# Patient Record
Sex: MALE | Race: BLACK OR AFRICAN AMERICAN | NOT HISPANIC OR LATINO | Employment: FULL TIME | URBAN - METROPOLITAN AREA
[De-identification: names, ages, dates, MRNs, and addresses within clinical notes are randomized per-mention and may not be internally consistent; named-entity substitution may affect disease eponyms.]

---

## 2021-10-09 ENCOUNTER — HOSPITAL ENCOUNTER (OUTPATIENT)
Facility: OTHER | Age: 61
Discharge: HOME | DRG: 419 | End: 2021-10-13
Attending: HOSPITALIST | Admitting: HOSPITALIST
Payer: COMMERCIAL

## 2021-10-09 DIAGNOSIS — K81.9 CHOLECYSTITIS: ICD-10-CM

## 2021-10-09 LAB
ALBUMIN SERPL BCP-MCNC: 3.4 G/DL (ref 3.5–5.2)
ALP SERPL-CCNC: 50 U/L (ref 55–135)
ALT SERPL W/O P-5'-P-CCNC: 16 U/L (ref 10–44)
ANION GAP SERPL CALC-SCNC: 10 MMOL/L (ref 8–16)
APTT BLDCRRT: 26.5 SEC (ref 21–32)
AST SERPL-CCNC: 19 U/L (ref 10–40)
BASOPHILS # BLD AUTO: 0.02 K/UL (ref 0–0.2)
BASOPHILS NFR BLD: 0.1 % (ref 0–1.9)
BILIRUB SERPL-MCNC: 1.6 MG/DL (ref 0.1–1)
BUN SERPL-MCNC: 6 MG/DL (ref 8–23)
CALCIUM SERPL-MCNC: 9 MG/DL (ref 8.7–10.5)
CHLORIDE SERPL-SCNC: 106 MMOL/L (ref 95–110)
CO2 SERPL-SCNC: 21 MMOL/L (ref 23–29)
CREAT SERPL-MCNC: 0.8 MG/DL (ref 0.5–1.4)
DIFFERENTIAL METHOD: ABNORMAL
EOSINOPHIL # BLD AUTO: 0 K/UL (ref 0–0.5)
EOSINOPHIL NFR BLD: 0 % (ref 0–8)
ERYTHROCYTE [DISTWIDTH] IN BLOOD BY AUTOMATED COUNT: 12.9 % (ref 11.5–14.5)
EST. GFR  (AFRICAN AMERICAN): >60 ML/MIN/1.73 M^2
EST. GFR  (NON AFRICAN AMERICAN): >60 ML/MIN/1.73 M^2
GLUCOSE SERPL-MCNC: 118 MG/DL (ref 70–110)
HCT VFR BLD AUTO: 43.8 % (ref 40–54)
HGB BLD-MCNC: 14.3 G/DL (ref 14–18)
IMM GRANULOCYTES # BLD AUTO: 0.09 K/UL (ref 0–0.04)
IMM GRANULOCYTES NFR BLD AUTO: 0.5 % (ref 0–0.5)
INR PPP: 1 (ref 0.8–1.2)
LACTATE SERPL-SCNC: 1.2 MMOL/L (ref 0.5–2.2)
LYMPHOCYTES # BLD AUTO: 1.1 K/UL (ref 1–4.8)
LYMPHOCYTES NFR BLD: 6.4 % (ref 18–48)
MAGNESIUM SERPL-MCNC: 2 MG/DL (ref 1.6–2.6)
MCH RBC QN AUTO: 29.9 PG (ref 27–31)
MCHC RBC AUTO-ENTMCNC: 32.6 G/DL (ref 32–36)
MCV RBC AUTO: 91 FL (ref 82–98)
MONOCYTES # BLD AUTO: 1.8 K/UL (ref 0.3–1)
MONOCYTES NFR BLD: 10.6 % (ref 4–15)
NEUTROPHILS # BLD AUTO: 13.9 K/UL (ref 1.8–7.7)
NEUTROPHILS NFR BLD: 82.4 % (ref 38–73)
NRBC BLD-RTO: 0 /100 WBC
PHOSPHATE SERPL-MCNC: 2.4 MG/DL (ref 2.7–4.5)
PLATELET # BLD AUTO: 189 K/UL (ref 150–450)
PMV BLD AUTO: 9.7 FL (ref 9.2–12.9)
POTASSIUM SERPL-SCNC: 4 MMOL/L (ref 3.5–5.1)
PROT SERPL-MCNC: 7 G/DL (ref 6–8.4)
PROTHROMBIN TIME: 11.2 SEC (ref 9–12.5)
RBC # BLD AUTO: 4.79 M/UL (ref 4.6–6.2)
SODIUM SERPL-SCNC: 137 MMOL/L (ref 136–145)
WBC # BLD AUTO: 16.87 K/UL (ref 3.9–12.7)

## 2021-10-09 PROCEDURE — 85610 PROTHROMBIN TIME: CPT | Performed by: NURSE PRACTITIONER

## 2021-10-09 PROCEDURE — G0378 HOSPITAL OBSERVATION PER HR: HCPCS

## 2021-10-09 PROCEDURE — 63600175 PHARM REV CODE 636 W HCPCS: Performed by: NURSE PRACTITIONER

## 2021-10-09 PROCEDURE — 96365 THER/PROPH/DIAG IV INF INIT: CPT | Performed by: HOSPITALIST

## 2021-10-09 PROCEDURE — 85025 COMPLETE CBC W/AUTO DIFF WBC: CPT | Performed by: NURSE PRACTITIONER

## 2021-10-09 PROCEDURE — 99223 1ST HOSP IP/OBS HIGH 75: CPT | Mod: ,,, | Performed by: NURSE PRACTITIONER

## 2021-10-09 PROCEDURE — G0379 DIRECT REFER HOSPITAL OBSERV: HCPCS

## 2021-10-09 PROCEDURE — 99223 PR INITIAL HOSPITAL CARE,LEVL III: ICD-10-PCS | Mod: ,,, | Performed by: NURSE PRACTITIONER

## 2021-10-09 PROCEDURE — 11000001 HC ACUTE MED/SURG PRIVATE ROOM

## 2021-10-09 PROCEDURE — 36415 COLL VENOUS BLD VENIPUNCTURE: CPT | Performed by: NURSE PRACTITIONER

## 2021-10-09 PROCEDURE — 83605 ASSAY OF LACTIC ACID: CPT | Performed by: NURSE PRACTITIONER

## 2021-10-09 PROCEDURE — 96366 THER/PROPH/DIAG IV INF ADDON: CPT | Performed by: HOSPITALIST

## 2021-10-09 PROCEDURE — 85730 THROMBOPLASTIN TIME PARTIAL: CPT | Performed by: NURSE PRACTITIONER

## 2021-10-09 PROCEDURE — 25000003 PHARM REV CODE 250: Performed by: NURSE PRACTITIONER

## 2021-10-09 PROCEDURE — 84100 ASSAY OF PHOSPHORUS: CPT | Performed by: NURSE PRACTITIONER

## 2021-10-09 PROCEDURE — 80053 COMPREHEN METABOLIC PANEL: CPT | Performed by: NURSE PRACTITIONER

## 2021-10-09 PROCEDURE — 83735 ASSAY OF MAGNESIUM: CPT | Performed by: NURSE PRACTITIONER

## 2021-10-09 PROCEDURE — 96375 TX/PRO/DX INJ NEW DRUG ADDON: CPT | Performed by: HOSPITALIST

## 2021-10-09 RX ORDER — ACETAMINOPHEN 325 MG/1
650 TABLET ORAL EVERY 4 HOURS PRN
Status: DISCONTINUED | OUTPATIENT
Start: 2021-10-09 | End: 2021-10-13 | Stop reason: HOSPADM

## 2021-10-09 RX ORDER — HYDROMORPHONE HYDROCHLORIDE 1 MG/ML
1 INJECTION, SOLUTION INTRAMUSCULAR; INTRAVENOUS; SUBCUTANEOUS EVERY 4 HOURS PRN
Status: DISCONTINUED | OUTPATIENT
Start: 2021-10-09 | End: 2021-10-13 | Stop reason: HOSPADM

## 2021-10-09 RX ORDER — ONDANSETRON 8 MG/1
8 TABLET, ORALLY DISINTEGRATING ORAL EVERY 8 HOURS PRN
Status: DISCONTINUED | OUTPATIENT
Start: 2021-10-09 | End: 2021-10-13 | Stop reason: HOSPADM

## 2021-10-09 RX ORDER — SODIUM CHLORIDE 9 MG/ML
INJECTION, SOLUTION INTRAVENOUS CONTINUOUS
Status: DISCONTINUED | OUTPATIENT
Start: 2021-10-09 | End: 2021-10-13 | Stop reason: HOSPADM

## 2021-10-09 RX ORDER — SODIUM CHLORIDE 0.9 % (FLUSH) 0.9 %
10 SYRINGE (ML) INJECTION EVERY 8 HOURS PRN
Status: DISCONTINUED | OUTPATIENT
Start: 2021-10-09 | End: 2021-10-13 | Stop reason: HOSPADM

## 2021-10-09 RX ADMIN — PIPERACILLIN SODIUM AND TAZOBACTAM SODIUM 4.5 G: 4; .5 INJECTION, POWDER, FOR SOLUTION INTRAVENOUS at 09:10

## 2021-10-09 RX ADMIN — HYDROMORPHONE HYDROCHLORIDE 1 MG: 1 INJECTION, SOLUTION INTRAMUSCULAR; INTRAVENOUS; SUBCUTANEOUS at 09:10

## 2021-10-09 RX ADMIN — SODIUM CHLORIDE: 0.9 INJECTION, SOLUTION INTRAVENOUS at 09:10

## 2021-10-09 NOTE — PLAN OF CARE
(Physician in Lead of Transfers)   Outside Transfer Acceptance Note / Regional Referral [facility]    Transferring Physician: Mark Chaney MD (ED)    Accepting Physician: Sindy Lepe MD    Date of Acceptance: 10/09/2021    Transferring Facility: [facility] ED    Destination Facility and Admitting Physician: [facility], Dr Carole Sorenson    Reason for Transfer: pt insurance is [facility] only    Report from Transferring Physician/Hospital course: 60M w no known PMH who presented to the  ED with postprandial abd pain x 3 days, diffuse B UQ (R >> L), with N/V x 1 and the emesis contained small blood clots, and he also has diaphoresis.   In ED, VSS.  Pain 9/10. WBC 15K, H/H wnl at 15.4/45.5. Cr 0.8. TB 1.1. AP 64. ASt and ALT 17 and 64. Lipase 22. . Trop neg. RUQ U/S: cholecytitis, +cholelithiasis with thickened GB wall (4.0 mm) and trace pericholecystic fluid, distended with 1.8cm stone at gallbladder neck, CBD wnl 3.4mm. Incidental probable hepatic hemangiomas (nonshadowing echogenic liver lesions). CT A/p with IV cont: GB distended with wall thickening, perichole fluid, 2.3cm stone near GB neck, c/w cholecystitis - see full report scanned in media tab for incidental findings. Rec'd Zosyn.     Pt's insurance is [facility], [facility]  Dr Ronny Pineda, gen surg [facility] to consult and requested HM to be primary    Fully vaccinated against COVID (Pfizer) per record review.    VS: 98.5, 174/86, HR 62, 99% RA    Labs: see above. COVID negative    Radiographs: see above    To Do List: Admit to HM, consult gen surg. See outside records scanned in to media tab in Epic.    Upon patient arrival to floor, please contact Hospital Medicine on call.     Sindy Lepe MD  Hospital Medicine Staff

## 2021-10-10 LAB
ALBUMIN SERPL BCP-MCNC: 3.1 G/DL (ref 3.5–5.2)
ALP SERPL-CCNC: 79 U/L (ref 55–135)
ALT SERPL W/O P-5'-P-CCNC: 74 U/L (ref 10–44)
ANION GAP SERPL CALC-SCNC: 11 MMOL/L (ref 8–16)
AST SERPL-CCNC: 91 U/L (ref 10–40)
BASOPHILS # BLD AUTO: 0.03 K/UL (ref 0–0.2)
BASOPHILS NFR BLD: 0.2 % (ref 0–1.9)
BILIRUB SERPL-MCNC: 2.1 MG/DL (ref 0.1–1)
BUN SERPL-MCNC: 7 MG/DL (ref 8–23)
CALCIUM SERPL-MCNC: 9 MG/DL (ref 8.7–10.5)
CHLORIDE SERPL-SCNC: 106 MMOL/L (ref 95–110)
CO2 SERPL-SCNC: 21 MMOL/L (ref 23–29)
CREAT SERPL-MCNC: 0.8 MG/DL (ref 0.5–1.4)
DIFFERENTIAL METHOD: ABNORMAL
EOSINOPHIL # BLD AUTO: 0 K/UL (ref 0–0.5)
EOSINOPHIL NFR BLD: 0.1 % (ref 0–8)
ERYTHROCYTE [DISTWIDTH] IN BLOOD BY AUTOMATED COUNT: 13 % (ref 11.5–14.5)
EST. GFR  (AFRICAN AMERICAN): >60 ML/MIN/1.73 M^2
EST. GFR  (NON AFRICAN AMERICAN): >60 ML/MIN/1.73 M^2
GLUCOSE SERPL-MCNC: 113 MG/DL (ref 70–110)
HCT VFR BLD AUTO: 41.9 % (ref 40–54)
HGB BLD-MCNC: 13.5 G/DL (ref 14–18)
IMM GRANULOCYTES # BLD AUTO: 0.07 K/UL (ref 0–0.04)
IMM GRANULOCYTES NFR BLD AUTO: 0.5 % (ref 0–0.5)
LYMPHOCYTES # BLD AUTO: 1 K/UL (ref 1–4.8)
LYMPHOCYTES NFR BLD: 6.5 % (ref 18–48)
MAGNESIUM SERPL-MCNC: 2 MG/DL (ref 1.6–2.6)
MCH RBC QN AUTO: 29.5 PG (ref 27–31)
MCHC RBC AUTO-ENTMCNC: 32.2 G/DL (ref 32–36)
MCV RBC AUTO: 92 FL (ref 82–98)
MONOCYTES # BLD AUTO: 1.6 K/UL (ref 0.3–1)
MONOCYTES NFR BLD: 10.6 % (ref 4–15)
NEUTROPHILS # BLD AUTO: 12.6 K/UL (ref 1.8–7.7)
NEUTROPHILS NFR BLD: 82.1 % (ref 38–73)
NRBC BLD-RTO: 0 /100 WBC
PHOSPHATE SERPL-MCNC: 2.1 MG/DL (ref 2.7–4.5)
PLATELET # BLD AUTO: 191 K/UL (ref 150–450)
PMV BLD AUTO: 10 FL (ref 9.2–12.9)
POTASSIUM SERPL-SCNC: 3.9 MMOL/L (ref 3.5–5.1)
PROT SERPL-MCNC: 6.7 G/DL (ref 6–8.4)
RBC # BLD AUTO: 4.57 M/UL (ref 4.6–6.2)
SODIUM SERPL-SCNC: 138 MMOL/L (ref 136–145)
WBC # BLD AUTO: 15.3 K/UL (ref 3.9–12.7)

## 2021-10-10 PROCEDURE — 84100 ASSAY OF PHOSPHORUS: CPT | Performed by: NURSE PRACTITIONER

## 2021-10-10 PROCEDURE — 80053 COMPREHEN METABOLIC PANEL: CPT | Performed by: NURSE PRACTITIONER

## 2021-10-10 PROCEDURE — 96372 THER/PROPH/DIAG INJ SC/IM: CPT | Mod: 59 | Performed by: HOSPITALIST

## 2021-10-10 PROCEDURE — 63600175 PHARM REV CODE 636 W HCPCS: Performed by: NURSE PRACTITIONER

## 2021-10-10 PROCEDURE — 99232 PR SUBSEQUENT HOSPITAL CARE,LEVL II: ICD-10-PCS | Mod: ,,, | Performed by: PHYSICIAN ASSISTANT

## 2021-10-10 PROCEDURE — G0378 HOSPITAL OBSERVATION PER HR: HCPCS

## 2021-10-10 PROCEDURE — 85025 COMPLETE CBC W/AUTO DIFF WBC: CPT | Performed by: NURSE PRACTITIONER

## 2021-10-10 PROCEDURE — 11000001 HC ACUTE MED/SURG PRIVATE ROOM

## 2021-10-10 PROCEDURE — 99232 PR SUBSEQUENT HOSPITAL CARE,LEVL II: ICD-10-PCS | Mod: 57,,, | Performed by: SPECIALIST

## 2021-10-10 PROCEDURE — 96376 TX/PRO/DX INJ SAME DRUG ADON: CPT | Performed by: HOSPITALIST

## 2021-10-10 PROCEDURE — 83735 ASSAY OF MAGNESIUM: CPT | Performed by: NURSE PRACTITIONER

## 2021-10-10 PROCEDURE — 36415 COLL VENOUS BLD VENIPUNCTURE: CPT | Performed by: NURSE PRACTITIONER

## 2021-10-10 PROCEDURE — 99232 SBSQ HOSP IP/OBS MODERATE 35: CPT | Mod: ,,, | Performed by: PHYSICIAN ASSISTANT

## 2021-10-10 PROCEDURE — 25000003 PHARM REV CODE 250: Performed by: NURSE PRACTITIONER

## 2021-10-10 PROCEDURE — 63600175 PHARM REV CODE 636 W HCPCS: Performed by: PHYSICIAN ASSISTANT

## 2021-10-10 PROCEDURE — 25000003 PHARM REV CODE 250: Performed by: PHYSICIAN ASSISTANT

## 2021-10-10 PROCEDURE — 96366 THER/PROPH/DIAG IV INF ADDON: CPT | Performed by: HOSPITALIST

## 2021-10-10 PROCEDURE — 99232 SBSQ HOSP IP/OBS MODERATE 35: CPT | Mod: 57,,, | Performed by: SPECIALIST

## 2021-10-10 PROCEDURE — 96375 TX/PRO/DX INJ NEW DRUG ADDON: CPT | Performed by: HOSPITALIST

## 2021-10-10 PROCEDURE — 96361 HYDRATE IV INFUSION ADD-ON: CPT | Performed by: HOSPITALIST

## 2021-10-10 RX ORDER — ENOXAPARIN SODIUM 100 MG/ML
40 INJECTION SUBCUTANEOUS EVERY 24 HOURS
Status: DISCONTINUED | OUTPATIENT
Start: 2021-10-10 | End: 2021-10-13 | Stop reason: HOSPADM

## 2021-10-10 RX ADMIN — ACETAMINOPHEN 650 MG: 325 TABLET ORAL at 03:10

## 2021-10-10 RX ADMIN — HYDROMORPHONE HYDROCHLORIDE 1 MG: 1 INJECTION, SOLUTION INTRAMUSCULAR; INTRAVENOUS; SUBCUTANEOUS at 03:10

## 2021-10-10 RX ADMIN — PIPERACILLIN SODIUM AND TAZOBACTAM SODIUM 4.5 G: 4; .5 INJECTION, POWDER, FOR SOLUTION INTRAVENOUS at 01:10

## 2021-10-10 RX ADMIN — SODIUM CHLORIDE: 0.9 INJECTION, SOLUTION INTRAVENOUS at 05:10

## 2021-10-10 RX ADMIN — PIPERACILLIN SODIUM AND TAZOBACTAM SODIUM 4.5 G: 4; .5 INJECTION, POWDER, FOR SOLUTION INTRAVENOUS at 10:10

## 2021-10-10 RX ADMIN — PIPERACILLIN SODIUM AND TAZOBACTAM SODIUM 4.5 G: 4; .5 INJECTION, POWDER, FOR SOLUTION INTRAVENOUS at 05:10

## 2021-10-10 RX ADMIN — ENOXAPARIN SODIUM 40 MG: 100 INJECTION SUBCUTANEOUS at 05:10

## 2021-10-10 RX ADMIN — POTASSIUM PHOSPHATE, MONOBASIC AND POTASSIUM PHOSPHATE, DIBASIC 15 MMOL: 224; 236 INJECTION, SOLUTION, CONCENTRATE INTRAVENOUS at 08:10

## 2021-10-10 NOTE — SUBJECTIVE & OBJECTIVE
No past medical history on file.    No past surgical history on file.    Review of patient's allergies indicates:  No Known Allergies    No current facility-administered medications on file prior to encounter.     No current outpatient medications on file prior to encounter.     Family History     Family history is unknown by patient.        Tobacco Use    Smoking status: Not on file   Substance and Sexual Activity    Alcohol use: Not on file    Drug use: Not on file    Sexual activity: Not on file     Review of Systems   Constitutional: Positive for activity change and appetite change. Negative for fatigue and fever.   HENT: Negative for congestion, ear pain and postnasal drip.    Eyes: Negative for discharge.   Respiratory: Negative for apnea, shortness of breath and wheezing.    Cardiovascular: Negative for chest pain and leg swelling.   Gastrointestinal: Positive for abdominal pain, nausea and vomiting. Negative for abdominal distention.   Endocrine: Negative for polydipsia, polyphagia and polyuria.   Genitourinary: Negative for difficulty urinating, flank pain, frequency, hematuria and urgency.   Musculoskeletal: Negative for arthralgias and joint swelling.   Skin: Negative for pallor and rash.   Allergic/Immunologic: Negative for environmental allergies and food allergies.   Neurological: Negative for dizziness, speech difficulty, weakness, light-headedness and headaches.   Hematological: Does not bruise/bleed easily.   Psychiatric/Behavioral: Negative for agitation.     Objective:     Vital Signs (Most Recent):  Temp: 99.7 °F (37.6 °C) (10/09/21 2010)  Pulse: (!) 113 (10/09/21 2010)  Resp: 20 (10/09/21 2010)  BP: (!) 170/84 (10/09/21 2010)  SpO2: 95 % (10/09/21 2010) Vital Signs (24h Range):  Temp:  [98.8 °F (37.1 °C)-99.7 °F (37.6 °C)] 99.7 °F (37.6 °C)  Pulse:  [] 113  Resp:  [20-22] 20  SpO2:  [95 %-96 %] 95 %  BP: (148-170)/(70-84) 170/84     Weight: 111.3 kg (245 lb 6 oz)  Body mass index is  34.22 kg/m².    Physical Exam  Constitutional:       Appearance: Normal appearance. He is well-developed.   HENT:      Head: Normocephalic.   Eyes:      Conjunctiva/sclera: Conjunctivae normal.   Cardiovascular:      Rate and Rhythm: Regular rhythm. Tachycardia present.      Pulses:           Radial pulses are 2+ on the right side and 2+ on the left side.      Heart sounds: Normal heart sounds.   Pulmonary:      Effort: Pulmonary effort is normal.      Breath sounds: Normal breath sounds.   Abdominal:      General: Bowel sounds are increased. There is no distension.      Palpations: Abdomen is soft.      Tenderness: There is abdominal tenderness in the right upper quadrant.   Musculoskeletal:         General: Normal range of motion.      Cervical back: Normal range of motion and neck supple.   Skin:     General: Skin is warm and dry.      Coloration: Skin is pale.   Neurological:      Mental Status: He is alert and oriented to person, place, and time.      Motor: Motor function is intact.   Psychiatric:         Mood and Affect: Mood normal.         Speech: Speech normal.         Behavior: Behavior normal.             Significant Labs: All pertinent labs within the past 24 hours have been reviewed.    Significant Imaging: I have reviewed all pertinent imaging results/findings within the past 24 hours.

## 2021-10-10 NOTE — SUBJECTIVE & OBJECTIVE
Interval History: No events overnight.  Doing fine this morning, pain well controlled.  No N/V overnight.  Says he is ready to have surgery and go home.    Review of Systems   Constitutional: Negative for fatigue and fever.   Respiratory: Negative for cough, shortness of breath and wheezing.    Cardiovascular: Negative for chest pain, palpitations and leg swelling.   Gastrointestinal: Positive for abdominal pain (mild, improved). Negative for constipation, diarrhea, nausea and vomiting.   Genitourinary: Negative for difficulty urinating.   Musculoskeletal: Negative for gait problem.   Psychiatric/Behavioral: Negative for agitation and confusion.     Objective:     Vital Signs (Most Recent):  Temp: 99.4 °F (37.4 °C) (10/10/21 0712)  Pulse: 84 (10/10/21 0712)  Resp: 20 (10/10/21 0712)  BP: 138/82 (10/10/21 0712)  SpO2: 95 % (10/10/21 0712) Vital Signs (24h Range):  Temp:  [98.2 °F (36.8 °C)-99.7 °F (37.6 °C)] 99.4 °F (37.4 °C)  Pulse:  [] 84  Resp:  [16-22] 20  SpO2:  [94 %-96 %] 95 %  BP: (119-170)/(59-84) 138/82     Weight: 111.3 kg (245 lb 6 oz)  Body mass index is 34.22 kg/m².    Intake/Output Summary (Last 24 hours) at 10/10/2021 1040  Last data filed at 10/10/2021 0509  Gross per 24 hour   Intake 968.06 ml   Output 375 ml   Net 593.06 ml      Physical Exam  Vitals and nursing note reviewed.   Constitutional:       General: He is not in acute distress.     Appearance: Normal appearance. He is not ill-appearing.   Cardiovascular:      Rate and Rhythm: Normal rate and regular rhythm.      Pulses: Normal pulses.      Heart sounds: Normal heart sounds. No murmur heard.     Pulmonary:      Effort: Pulmonary effort is normal.      Breath sounds: Normal breath sounds. No wheezing or rales.   Abdominal:      General: Bowel sounds are normal. There is no distension.      Palpations: Abdomen is soft.      Tenderness: There is no abdominal tenderness (mild TTP in RUQ).   Musculoskeletal:      Right lower leg: No  edema.      Left lower leg: No edema.   Skin:     General: Skin is warm and dry.   Neurological:      General: No focal deficit present.      Mental Status: He is alert and oriented to person, place, and time. Mental status is at baseline.   Psychiatric:         Behavior: Behavior normal.         Thought Content: Thought content normal.         Significant Labs:   All pertinent labs within the past 24 hours have been reviewed.  CBC:   Recent Labs   Lab 10/09/21  2128 10/10/21  0326   WBC 16.87* 15.30*   HGB 14.3 13.5*   HCT 43.8 41.9    191     CMP:   Recent Labs   Lab 10/09/21  2128 10/10/21  0326    138   K 4.0 3.9    106   CO2 21* 21*   * 113*   BUN 6* 7*   CREATININE 0.8 0.8   CALCIUM 9.0 9.0   PROT 7.0 6.7   ALBUMIN 3.4* 3.1*   BILITOT 1.6* 2.1*   ALKPHOS 50* 79   AST 19 91*   ALT 16 74*   ANIONGAP 10 11   EGFRNONAA >60 >60       Significant Imaging: I have reviewed all pertinent imaging results/findings within the past 24 hours.

## 2021-10-10 NOTE — CONSULTS
[facility]  Consult Note      Date of Consultation:10/10/2021    Reason for Consult:  Abdominal pain  SUBJECTIVE:     History of Present Illness:  60-year-old male transferred from [facility] with history of upper abdominal pain since Wednesday.  This was associated with nausea and vomiting.  Patient denies jaundice, weight loss, change in bowel habits.  Ultrasound at [facility] showed cholelithiasis with gallbladder wall thickening and pericholecystic edema    Past Medical History:   Diagnosis Date    No pertinent past medical history      Past Surgical History:   Procedure Laterality Date    NO PAST SURGERIES       Family History   Family history unknown: Yes     Social History     Tobacco Use    Smoking status: Never Smoker    Smokeless tobacco: Never Used   Substance Use Topics    Alcohol use: Not Currently     Comment: stopped drinking beer in 2003    Drug use: Never       Review of patient's allergies indicates:  No Known Allergies    Review of Systems:  Constitutional: No fever or chills, no jaundice  Respiratory: No cough or shortness of breath  Cardiovascular: No chest pain or palpitations  Gastrointestinal:  Positive for nausea and vomiting, no diarrhea, constipation, weight loss, melena, hematochezia  Neurological: No confusion or weakness    Current Facility-Administered Medications   Medication    0.9%  NaCl infusion    acetaminophen tablet 650 mg    enoxaparin injection 40 mg    HYDROmorphone injection 1 mg    ondansetron disintegrating tablet 8 mg    piperacillin-tazobactam 4.5 g in dextrose 5 % 100 mL IVPB (ready to mix system)    sodium chloride 0.9% flush 10 mL       OBJECTIVE:     Physical Exam:  General appearance: Well developed, well nourished  Eyes:  Conjunctivae/corneas clear. PERRL.  Lungs: Normal respiratory effort,   clear to auscultation bilaterally   Heart: Regular rate and rhythm, S1, S2 normal, no murmur, rub or tanisha.  Abdomen: Soft,  non-tender non-distended; bowel sounds normal; no masses,  no organomegaly  Extremities: No cyanosis or clubbing. No edema.    Skin: Skin color, texture, turgor normal. No rashes or lesions  Neurologic: Normal strength and tone. No focal numbness or weakness       Laboratory:  Recent Labs   Lab 10/10/21  0326   WBC 15.30*   RBC 4.57*   HGB 13.5*   HCT 41.9      MCV 92   MCH 29.5   MCHC 32.2     BMP:   Recent Labs   Lab 10/10/21  0326   *      K 3.9      CO2 21*   BUN 7*   CREATININE 0.8   CALCIUM 9.0   MG 2.0     Lab Results   Component Value Date    CALCIUM 9.0 10/10/2021    PHOS 2.1 (L) 10/10/2021     BNP  No results for input(s): BNP, BNPTRIAGEBLO in the last 168 hours.No results found for: URICACIDNo results found for: IRON, TIBC, FERRITIN, SATURATEDIRO  Lab Results   Component Value Date    CALCIUM 9.0 10/10/2021    PHOS 2.1 (L) 10/10/2021       Diagnostic Results:  No orders to display       Impression:  Acute cholecystitis    Plan:     Thank you for the opportunity of seeing Yusuf Marie in consultation

## 2021-10-10 NOTE — H&P
[facility] Hospital Medicine  History & Physical    Patient Name: Yusuf Marie  MRN: [id number]  Patient Class: IP- Inpatient  Admission Date: 10/9/2021  Attending Physician: Carole Sorenson MD   Primary Care Provider: Primary Doctor No         Patient information was obtained from patient, past medical records and ER records.     Subjective:     Principal Problem:Cholecystitis    Chief Complaint: No chief complaint on file.       HPI: The patient is a 60 year old male with no known past medical history who presented to the  ED with postprandial abd pain x 3 days, diffuse bilateral upper abdominal pain with nausea and vomiting x 1 and the emesis contained small blood clots.  In ED, VSS.  Pain 9/10. RUQ U/S: cholecytitis, +cholelithiasis with thickened GB wall (4.0 mm) and trace pericholecystic fluid, distended with 1.8cm stone at gallbladder neck, CBD wnl 3.4mm. Incidental probable hepatic hemangiomas (nonshadowing echogenic liver lesions). CT A/p with IV cont: GB distended with wall thickening, perichole fluid, 2.3cm stone near GB neck, c/w cholecystitis.  The patient was transferred to [facility] due to insurance concerns for cholecystitis and General Surgery consult.      No past medical history on file.    No past surgical history on file.    Review of patient's allergies indicates:  No Known Allergies    No current facility-administered medications on file prior to encounter.     No current outpatient medications on file prior to encounter.     Family History     Family history is unknown by patient.        Tobacco Use    Smoking status: Not on file   Substance and Sexual Activity    Alcohol use: Not on file    Drug use: Not on file    Sexual activity: Not on file     Review of Systems   Constitutional: Positive for activity change and appetite change. Negative for fatigue and fever.   HENT: Negative for congestion, ear pain and postnasal drip.    Eyes: Negative for discharge.    Respiratory: Negative for apnea, shortness of breath and wheezing.    Cardiovascular: Negative for chest pain and leg swelling.   Gastrointestinal: Positive for abdominal pain, nausea and vomiting. Negative for abdominal distention.   Endocrine: Negative for polydipsia, polyphagia and polyuria.   Genitourinary: Negative for difficulty urinating, flank pain, frequency, hematuria and urgency.   Musculoskeletal: Negative for arthralgias and joint swelling.   Skin: Negative for pallor and rash.   Allergic/Immunologic: Negative for environmental allergies and food allergies.   Neurological: Negative for dizziness, speech difficulty, weakness, light-headedness and headaches.   Hematological: Does not bruise/bleed easily.   Psychiatric/Behavioral: Negative for agitation.     Objective:     Vital Signs (Most Recent):  Temp: 99.7 °F (37.6 °C) (10/09/21 2010)  Pulse: (!) 113 (10/09/21 2010)  Resp: 20 (10/09/21 2010)  BP: (!) 170/84 (10/09/21 2010)  SpO2: 95 % (10/09/21 2010) Vital Signs (24h Range):  Temp:  [98.8 °F (37.1 °C)-99.7 °F (37.6 °C)] 99.7 °F (37.6 °C)  Pulse:  [] 113  Resp:  [20-22] 20  SpO2:  [95 %-96 %] 95 %  BP: (148-170)/(70-84) 170/84     Weight: 111.3 kg (245 lb 6 oz)  Body mass index is 34.22 kg/m².    Physical Exam  Constitutional:       Appearance: Normal appearance. He is well-developed.   HENT:      Head: Normocephalic.   Eyes:      Conjunctiva/sclera: Conjunctivae normal.   Cardiovascular:      Rate and Rhythm: Regular rhythm. Tachycardia present.      Pulses:           Radial pulses are 2+ on the right side and 2+ on the left side.      Heart sounds: Normal heart sounds.   Pulmonary:      Effort: Pulmonary effort is normal.      Breath sounds: Normal breath sounds.   Abdominal:      General: Bowel sounds are increased. There is no distension.      Palpations: Abdomen is soft.      Tenderness: There is abdominal tenderness in the right upper quadrant.   Musculoskeletal:         General: Normal  range of motion.      Cervical back: Normal range of motion and neck supple.   Skin:     General: Skin is warm and dry.      Coloration: Skin is pale.   Neurological:      Mental Status: He is alert and oriented to person, place, and time.      Motor: Motor function is intact.   Psychiatric:         Mood and Affect: Mood normal.         Speech: Speech normal.         Behavior: Behavior normal.             Significant Labs: All pertinent labs within the past 24 hours have been reviewed.    Significant Imaging: I have reviewed all pertinent imaging results/findings within the past 24 hours.    Assessment/Plan:     * Cholecystitis  US- cholecytitis, +cholelithiasis with thickened GB wall (4.0 mm) and trace pericholecystic fluid, distended with 1.8cm stone at gallbladder neck, CBD wnl 3.4mm.    IVF  Consult General Surgery  Hydromorphone for pain  NPO        VTE Risk Mitigation (From admission, onward)         Ordered     IP VTE HIGH RISK PATIENT  Once         10/09/21 2044     Place sequential compression device  Until discontinued         10/09/21 2044     Reason for No Pharmacological VTE Prophylaxis  Once        Question:  Reasons:  Answer:  Risk of Bleeding    10/09/21 2044                   Skyler Bradley NP  Department of Hospital Medicine   [facility]

## 2021-10-10 NOTE — NURSING
Admitted to room 346 per EMS.vs b/p 170/84. States pain is 5/10 RUQ.Skyler Bradley NP notified of admission.

## 2021-10-10 NOTE — NURSING
Assumed patient care. AAOx4 lying in bed. Denies any complaints of pain at this time. + abd tenderness.  Denies any nausea. NPO status maintained. IVF infusing as ordered. Temp 99.4 noted.  Will continue monitorining

## 2021-10-10 NOTE — HOSPITAL COURSE
59 y/o male with no PMH admitted for cholecystitis due to cholelithiasis. Status post laparoscopic cholecystectomy for acute and chronic cholecystitis.  Tolerating p.o., pain resolved.  Stable for discharge home and outpatient follow-up.  Plan of care discussed with patient who acknowledged understanding and agree with plan.

## 2021-10-10 NOTE — NURSING
Patient has had no complaints of abd pain, nausea or vomiting today.  He tolerated a clr liquid diet without GI disturbance but is now NPO.  Given Tylenol 650mg for temp of 101.5.  IVF infusing.  Son at bedside. Will continue monitoring.

## 2021-10-10 NOTE — PLAN OF CARE
Vs stable. NPO maintained. Medicated for pain x 2 with relief. Voided clear stevan urine. Safety measures maintained.

## 2021-10-10 NOTE — PROGRESS NOTES
"[facility] Hospital Medicine  Progress Note    Patient Name: Yusuf Marie  MRN: [id number]  Patient Class: IP- Inpatient   Admission Date: 10/9/2021  Length of Stay: 1 days  Attending Physician: Carole Sorenson MD  Primary Care Provider: Primary Doctor No        Subjective:     Principal Problem:Cholecystitis        HPI:  Per Skyler Bradley, NP:  "The patient is a 60 year old male with no known past medical history who presented to the  ED with postprandial abd pain x 3 days, diffuse bilateral upper abdominal pain with nausea and vomiting x 1 and the emesis contained small blood clots.  In ED, VSS.  Pain 9/10. RUQ U/S: cholecytitis, +cholelithiasis with thickened GB wall (4.0 mm) and trace pericholecystic fluid, distended with 1.8cm stone at gallbladder neck, CBD wnl 3.4mm. Incidental probable hepatic hemangiomas (nonshadowing echogenic liver lesions). CT A/p with IV cont: GB distended with wall thickening, perichole fluid, 2.3cm stone near GB neck, c/w cholecystitis.  The patient was transferred to [facility] due to insurance concerns for cholecystitis and General Surgery consult."      Overview/Hospital Course:  59 y/o male with no PMH admitted for cholecystitis due to cholelithiasis.      Interval History: No events overnight.  Doing fine this morning, pain well controlled.  No N/V overnight.  Says he is ready to have surgery and go home.    Review of Systems   Constitutional: Negative for fatigue and fever.   Respiratory: Negative for cough, shortness of breath and wheezing.    Cardiovascular: Negative for chest pain, palpitations and leg swelling.   Gastrointestinal: Positive for abdominal pain (mild, improved). Negative for constipation, diarrhea, nausea and vomiting.   Genitourinary: Negative for difficulty urinating.   Musculoskeletal: Negative for gait problem.   Psychiatric/Behavioral: Negative for agitation and confusion.     Objective:     Vital Signs (Most Recent):  Temp: 99.4 " °F (37.4 °C) (10/10/21 0712)  Pulse: 84 (10/10/21 0712)  Resp: 20 (10/10/21 0712)  BP: 138/82 (10/10/21 0712)  SpO2: 95 % (10/10/21 0712) Vital Signs (24h Range):  Temp:  [98.2 °F (36.8 °C)-99.7 °F (37.6 °C)] 99.4 °F (37.4 °C)  Pulse:  [] 84  Resp:  [16-22] 20  SpO2:  [94 %-96 %] 95 %  BP: (119-170)/(59-84) 138/82     Weight: 111.3 kg (245 lb 6 oz)  Body mass index is 34.22 kg/m².    Intake/Output Summary (Last 24 hours) at 10/10/2021 1040  Last data filed at 10/10/2021 0509  Gross per 24 hour   Intake 968.06 ml   Output 375 ml   Net 593.06 ml      Physical Exam  Vitals and nursing note reviewed.   Constitutional:       General: He is not in acute distress.     Appearance: Normal appearance. He is not ill-appearing.   Cardiovascular:      Rate and Rhythm: Normal rate and regular rhythm.      Pulses: Normal pulses.      Heart sounds: Normal heart sounds. No murmur heard.     Pulmonary:      Effort: Pulmonary effort is normal.      Breath sounds: Normal breath sounds. No wheezing or rales.   Abdominal:      General: Bowel sounds are normal. There is no distension.      Palpations: Abdomen is soft.      Tenderness: There is no abdominal tenderness (mild TTP in RUQ).   Musculoskeletal:      Right lower leg: No edema.      Left lower leg: No edema.   Skin:     General: Skin is warm and dry.   Neurological:      General: No focal deficit present.      Mental Status: He is alert and oriented to person, place, and time. Mental status is at baseline.   Psychiatric:         Behavior: Behavior normal.         Thought Content: Thought content normal.         Significant Labs:   All pertinent labs within the past 24 hours have been reviewed.  CBC:   Recent Labs   Lab 10/09/21  2128 10/10/21  0326   WBC 16.87* 15.30*   HGB 14.3 13.5*   HCT 43.8 41.9    191     CMP:   Recent Labs   Lab 10/09/21  2128 10/10/21  0326    138   K 4.0 3.9    106   CO2 21* 21*   * 113*   BUN 6* 7*   CREATININE 0.8 0.8    CALCIUM 9.0 9.0   PROT 7.0 6.7   ALBUMIN 3.4* 3.1*   BILITOT 1.6* 2.1*   ALKPHOS 50* 79   AST 19 91*   ALT 16 74*   ANIONGAP 10 11   EGFRNONAA >60 >60       Significant Imaging: I have reviewed all pertinent imaging results/findings within the past 24 hours.      Assessment/Plan:      * Cholecystitis  - RUQ US (done at , report in chart) with cholecytitis, +cholelithiasis with thickened GB wall (4.0 mm) and trace pericholecystic fluid, distended with 1.8cm stone at gallbladder neck, CBD wnl 3.4mm.  - CT A/P (done at , report in chart) also consistent with cholecystitis  - continue Zosyn (started 10/9)  - NPO  - IVFs  - pain control  - General surgery consulted          VTE Risk Mitigation (From admission, onward)         Ordered     enoxaparin injection 40 mg  Daily         10/10/21 1116     IP VTE HIGH RISK PATIENT  Once         10/09/21 2044     Place sequential compression device  Until discontinued         10/09/21 2044                Discharge Planning   OSCAR:      Code Status: Full Code   Is the patient medically ready for discharge?:     Reason for patient still in hospital (select all that apply): Treatment and Consult recommendations                     Makenna Trejo PA-C  Department of Hospital Medicine   [facility]

## 2021-10-11 ENCOUNTER — ANESTHESIA (OUTPATIENT)
Dept: SURGERY | Facility: OTHER | Age: 61
DRG: 419 | End: 2021-10-11
Payer: COMMERCIAL

## 2021-10-11 ENCOUNTER — ANESTHESIA EVENT (OUTPATIENT)
Dept: SURGERY | Facility: OTHER | Age: 61
DRG: 419 | End: 2021-10-11
Payer: COMMERCIAL

## 2021-10-11 LAB
ALBUMIN SERPL BCP-MCNC: 2.8 G/DL (ref 3.5–5.2)
ALP SERPL-CCNC: 77 U/L (ref 55–135)
ALT SERPL W/O P-5'-P-CCNC: 58 U/L (ref 10–44)
ANION GAP SERPL CALC-SCNC: 10 MMOL/L (ref 8–16)
AST SERPL-CCNC: 36 U/L (ref 10–40)
BASOPHILS # BLD AUTO: 0.01 K/UL (ref 0–0.2)
BASOPHILS NFR BLD: 0.1 % (ref 0–1.9)
BILIRUB SERPL-MCNC: 1.4 MG/DL (ref 0.1–1)
BUN SERPL-MCNC: 9 MG/DL (ref 8–23)
CALCIUM SERPL-MCNC: 8.8 MG/DL (ref 8.7–10.5)
CHLORIDE SERPL-SCNC: 108 MMOL/L (ref 95–110)
CO2 SERPL-SCNC: 21 MMOL/L (ref 23–29)
CREAT SERPL-MCNC: 0.8 MG/DL (ref 0.5–1.4)
DIFFERENTIAL METHOD: ABNORMAL
EOSINOPHIL # BLD AUTO: 0.1 K/UL (ref 0–0.5)
EOSINOPHIL NFR BLD: 0.8 % (ref 0–8)
ERYTHROCYTE [DISTWIDTH] IN BLOOD BY AUTOMATED COUNT: 12.9 % (ref 11.5–14.5)
EST. GFR  (AFRICAN AMERICAN): >60 ML/MIN/1.73 M^2
EST. GFR  (NON AFRICAN AMERICAN): >60 ML/MIN/1.73 M^2
GLUCOSE SERPL-MCNC: 88 MG/DL (ref 70–110)
HCT VFR BLD AUTO: 39.2 % (ref 40–54)
HGB BLD-MCNC: 12.6 G/DL (ref 14–18)
IMM GRANULOCYTES # BLD AUTO: 0.05 K/UL (ref 0–0.04)
IMM GRANULOCYTES NFR BLD AUTO: 0.5 % (ref 0–0.5)
LYMPHOCYTES # BLD AUTO: 1.2 K/UL (ref 1–4.8)
LYMPHOCYTES NFR BLD: 11.4 % (ref 18–48)
MAGNESIUM SERPL-MCNC: 2 MG/DL (ref 1.6–2.6)
MCH RBC QN AUTO: 29.6 PG (ref 27–31)
MCHC RBC AUTO-ENTMCNC: 32.1 G/DL (ref 32–36)
MCV RBC AUTO: 92 FL (ref 82–98)
MONOCYTES # BLD AUTO: 1.1 K/UL (ref 0.3–1)
MONOCYTES NFR BLD: 10.3 % (ref 4–15)
NEUTROPHILS # BLD AUTO: 8.1 K/UL (ref 1.8–7.7)
NEUTROPHILS NFR BLD: 76.9 % (ref 38–73)
NRBC BLD-RTO: 0 /100 WBC
PHOSPHATE SERPL-MCNC: 1.9 MG/DL (ref 2.7–4.5)
PLATELET # BLD AUTO: 191 K/UL (ref 150–450)
PMV BLD AUTO: 10 FL (ref 9.2–12.9)
POCT GLUCOSE: 140 MG/DL (ref 70–110)
POTASSIUM SERPL-SCNC: 3.7 MMOL/L (ref 3.5–5.1)
PROT SERPL-MCNC: 6.5 G/DL (ref 6–8.4)
RBC # BLD AUTO: 4.26 M/UL (ref 4.6–6.2)
SARS-COV-2 RDRP RESP QL NAA+PROBE: NEGATIVE
SODIUM SERPL-SCNC: 139 MMOL/L (ref 136–145)
WBC # BLD AUTO: 10.59 K/UL (ref 3.9–12.7)

## 2021-10-11 PROCEDURE — 25000003 PHARM REV CODE 250: Performed by: STUDENT IN AN ORGANIZED HEALTH CARE EDUCATION/TRAINING PROGRAM

## 2021-10-11 PROCEDURE — 25000003 PHARM REV CODE 250: Performed by: NURSE PRACTITIONER

## 2021-10-11 PROCEDURE — 11000001 HC ACUTE MED/SURG PRIVATE ROOM

## 2021-10-11 PROCEDURE — 94761 N-INVAS EAR/PLS OXIMETRY MLT: CPT

## 2021-10-11 PROCEDURE — 63600175 PHARM REV CODE 636 W HCPCS: Mod: JG | Performed by: STUDENT IN AN ORGANIZED HEALTH CARE EDUCATION/TRAINING PROGRAM

## 2021-10-11 PROCEDURE — 63600175 PHARM REV CODE 636 W HCPCS: Performed by: NURSE ANESTHETIST, CERTIFIED REGISTERED

## 2021-10-11 PROCEDURE — 63600175 PHARM REV CODE 636 W HCPCS: Performed by: NURSE PRACTITIONER

## 2021-10-11 PROCEDURE — 88304 PR  SURG PATH,LEVEL III: ICD-10-PCS | Mod: 26,,, | Performed by: PATHOLOGY

## 2021-10-11 PROCEDURE — 36000708 HC OR TIME LEV III 1ST 15 MIN: Performed by: SPECIALIST

## 2021-10-11 PROCEDURE — C1729 CATH, DRAINAGE: HCPCS | Performed by: SPECIALIST

## 2021-10-11 PROCEDURE — 85025 COMPLETE CBC W/AUTO DIFF WBC: CPT | Performed by: NURSE PRACTITIONER

## 2021-10-11 PROCEDURE — 25000003 PHARM REV CODE 250: Performed by: NURSE ANESTHETIST, CERTIFIED REGISTERED

## 2021-10-11 PROCEDURE — 88304 TISSUE EXAM BY PATHOLOGIST: CPT | Mod: 26,,, | Performed by: PATHOLOGY

## 2021-10-11 PROCEDURE — 47562 PR LAP,CHOLECYSTECTOMY: ICD-10-PCS | Mod: ,,, | Performed by: SPECIALIST

## 2021-10-11 PROCEDURE — 96372 THER/PROPH/DIAG INJ SC/IM: CPT | Mod: 59 | Performed by: HOSPITALIST

## 2021-10-11 PROCEDURE — 25000003 PHARM REV CODE 250: Performed by: HOSPITALIST

## 2021-10-11 PROCEDURE — P9045 ALBUMIN (HUMAN), 5%, 250 ML: HCPCS | Mod: JG | Performed by: STUDENT IN AN ORGANIZED HEALTH CARE EDUCATION/TRAINING PROGRAM

## 2021-10-11 PROCEDURE — 96361 HYDRATE IV INFUSION ADD-ON: CPT | Performed by: HOSPITALIST

## 2021-10-11 PROCEDURE — 36415 COLL VENOUS BLD VENIPUNCTURE: CPT | Performed by: NURSE PRACTITIONER

## 2021-10-11 PROCEDURE — 37000008 HC ANESTHESIA 1ST 15 MINUTES: Performed by: SPECIALIST

## 2021-10-11 PROCEDURE — 47562 LAPAROSCOPIC CHOLECYSTECTOMY: CPT | Mod: ,,, | Performed by: SPECIALIST

## 2021-10-11 PROCEDURE — 99232 SBSQ HOSP IP/OBS MODERATE 35: CPT | Mod: ,,, | Performed by: PHYSICIAN ASSISTANT

## 2021-10-11 PROCEDURE — 84100 ASSAY OF PHOSPHORUS: CPT | Performed by: NURSE PRACTITIONER

## 2021-10-11 PROCEDURE — 88304 TISSUE EXAM BY PATHOLOGIST: CPT | Performed by: PATHOLOGY

## 2021-10-11 PROCEDURE — 25000003 PHARM REV CODE 250: Performed by: ANESTHESIOLOGY

## 2021-10-11 PROCEDURE — 96376 TX/PRO/DX INJ SAME DRUG ADON: CPT | Performed by: HOSPITALIST

## 2021-10-11 PROCEDURE — G0378 HOSPITAL OBSERVATION PER HR: HCPCS

## 2021-10-11 PROCEDURE — 27000221 HC OXYGEN, UP TO 24 HOURS

## 2021-10-11 PROCEDURE — 37000009 HC ANESTHESIA EA ADD 15 MINS: Performed by: SPECIALIST

## 2021-10-11 PROCEDURE — 96366 THER/PROPH/DIAG IV INF ADDON: CPT | Mod: 59 | Performed by: HOSPITALIST

## 2021-10-11 PROCEDURE — 99900035 HC TECH TIME PER 15 MIN (STAT)

## 2021-10-11 PROCEDURE — 25000003 PHARM REV CODE 250: Performed by: PHYSICIAN ASSISTANT

## 2021-10-11 PROCEDURE — 63600175 PHARM REV CODE 636 W HCPCS: Performed by: ANESTHESIOLOGY

## 2021-10-11 PROCEDURE — 71000033 HC RECOVERY, INTIAL HOUR: Performed by: SPECIALIST

## 2021-10-11 PROCEDURE — 83735 ASSAY OF MAGNESIUM: CPT | Performed by: NURSE PRACTITIONER

## 2021-10-11 PROCEDURE — 36000709 HC OR TIME LEV III EA ADD 15 MIN: Performed by: SPECIALIST

## 2021-10-11 PROCEDURE — 27201423 OPTIME MED/SURG SUP & DEVICES STERILE SUPPLY: Performed by: SPECIALIST

## 2021-10-11 PROCEDURE — 99232 PR SUBSEQUENT HOSPITAL CARE,LEVL II: ICD-10-PCS | Mod: ,,, | Performed by: PHYSICIAN ASSISTANT

## 2021-10-11 PROCEDURE — 80053 COMPREHEN METABOLIC PANEL: CPT | Performed by: NURSE PRACTITIONER

## 2021-10-11 PROCEDURE — 71000039 HC RECOVERY, EACH ADD'L HOUR: Performed by: SPECIALIST

## 2021-10-11 PROCEDURE — U0002 COVID-19 LAB TEST NON-CDC: HCPCS | Performed by: SPECIALIST

## 2021-10-11 RX ORDER — HYDROMORPHONE HYDROCHLORIDE 2 MG/ML
0.4 INJECTION, SOLUTION INTRAMUSCULAR; INTRAVENOUS; SUBCUTANEOUS EVERY 5 MIN PRN
Status: DISCONTINUED | OUTPATIENT
Start: 2021-10-11 | End: 2021-10-13 | Stop reason: HOSPADM

## 2021-10-11 RX ORDER — FENTANYL CITRATE 50 UG/ML
INJECTION, SOLUTION INTRAMUSCULAR; INTRAVENOUS
Status: DISCONTINUED | OUTPATIENT
Start: 2021-10-11 | End: 2021-10-11

## 2021-10-11 RX ORDER — LIDOCAINE HYDROCHLORIDE 20 MG/ML
INJECTION INTRAVENOUS
Status: DISCONTINUED | OUTPATIENT
Start: 2021-10-11 | End: 2021-10-11

## 2021-10-11 RX ORDER — DEXAMETHASONE SODIUM PHOSPHATE 4 MG/ML
INJECTION, SOLUTION INTRA-ARTICULAR; INTRALESIONAL; INTRAMUSCULAR; INTRAVENOUS; SOFT TISSUE
Status: DISCONTINUED | OUTPATIENT
Start: 2021-10-11 | End: 2021-10-11

## 2021-10-11 RX ORDER — MUPIROCIN 20 MG/G
OINTMENT TOPICAL 2 TIMES DAILY
Status: DISCONTINUED | OUTPATIENT
Start: 2021-10-11 | End: 2021-10-13 | Stop reason: HOSPADM

## 2021-10-11 RX ORDER — ONDANSETRON 2 MG/ML
INJECTION INTRAMUSCULAR; INTRAVENOUS
Status: DISCONTINUED | OUTPATIENT
Start: 2021-10-11 | End: 2021-10-11

## 2021-10-11 RX ORDER — PHENYLEPHRINE HYDROCHLORIDE 10 MG/ML
INJECTION INTRAVENOUS
Status: DISCONTINUED | OUTPATIENT
Start: 2021-10-11 | End: 2021-10-11

## 2021-10-11 RX ORDER — ALBUMIN HUMAN 50 G/1000ML
SOLUTION INTRAVENOUS CONTINUOUS PRN
Status: DISCONTINUED | OUTPATIENT
Start: 2021-10-11 | End: 2021-10-11

## 2021-10-11 RX ORDER — SODIUM CHLORIDE 0.9 % (FLUSH) 0.9 %
3 SYRINGE (ML) INJECTION
Status: DISCONTINUED | OUTPATIENT
Start: 2021-10-11 | End: 2021-10-13 | Stop reason: HOSPADM

## 2021-10-11 RX ORDER — OXYCODONE HYDROCHLORIDE 5 MG/1
5 TABLET ORAL
Status: DISCONTINUED | OUTPATIENT
Start: 2021-10-11 | End: 2021-10-13 | Stop reason: HOSPADM

## 2021-10-11 RX ORDER — ROCURONIUM BROMIDE 10 MG/ML
INJECTION, SOLUTION INTRAVENOUS
Status: DISCONTINUED | OUTPATIENT
Start: 2021-10-11 | End: 2021-10-11

## 2021-10-11 RX ORDER — ONDANSETRON 2 MG/ML
4 INJECTION INTRAMUSCULAR; INTRAVENOUS DAILY PRN
Status: DISCONTINUED | OUTPATIENT
Start: 2021-10-11 | End: 2021-10-13 | Stop reason: HOSPADM

## 2021-10-11 RX ORDER — PROPOFOL 10 MG/ML
VIAL (ML) INTRAVENOUS
Status: DISCONTINUED | OUTPATIENT
Start: 2021-10-11 | End: 2021-10-11

## 2021-10-11 RX ORDER — MEPERIDINE HYDROCHLORIDE 25 MG/ML
12.5 INJECTION INTRAMUSCULAR; INTRAVENOUS; SUBCUTANEOUS ONCE AS NEEDED
Status: ACTIVE | OUTPATIENT
Start: 2021-10-11 | End: 2021-10-12

## 2021-10-11 RX ADMIN — ROCURONIUM BROMIDE 25 MG: 10 SOLUTION INTRAVENOUS at 02:10

## 2021-10-11 RX ADMIN — HYDROMORPHONE HYDROCHLORIDE 0.4 MG: 2 INJECTION INTRAMUSCULAR; INTRAVENOUS; SUBCUTANEOUS at 05:10

## 2021-10-11 RX ADMIN — SODIUM CHLORIDE: 0.9 INJECTION, SOLUTION INTRAVENOUS at 06:10

## 2021-10-11 RX ADMIN — PHENYLEPHRINE HYDROCHLORIDE 100 MCG: 10 INJECTION INTRAVENOUS at 02:10

## 2021-10-11 RX ADMIN — ONDANSETRON HYDROCHLORIDE 4 MG: 2 INJECTION INTRAMUSCULAR; INTRAVENOUS at 01:10

## 2021-10-11 RX ADMIN — MUPIROCIN: 20 OINTMENT TOPICAL at 10:10

## 2021-10-11 RX ADMIN — SUGAMMADEX 200 MG: 100 INJECTION, SOLUTION INTRAVENOUS at 04:10

## 2021-10-11 RX ADMIN — PIPERACILLIN SODIUM AND TAZOBACTAM SODIUM 4.5 G: 4; .5 INJECTION, POWDER, FOR SOLUTION INTRAVENOUS at 05:10

## 2021-10-11 RX ADMIN — PIPERACILLIN SODIUM AND TAZOBACTAM SODIUM 4.5 G: 4; .5 INJECTION, POWDER, FOR SOLUTION INTRAVENOUS at 02:10

## 2021-10-11 RX ADMIN — ALBUMIN (HUMAN): 2.5 SOLUTION INTRAVENOUS at 02:10

## 2021-10-11 RX ADMIN — PROPOFOL 200 MG: 10 INJECTION, EMULSION INTRAVENOUS at 01:10

## 2021-10-11 RX ADMIN — DEXAMETHASONE SODIUM PHOSPHATE 8 MG: 4 INJECTION, SOLUTION INTRAMUSCULAR; INTRAVENOUS at 04:10

## 2021-10-11 RX ADMIN — MUPIROCIN: 20 OINTMENT TOPICAL at 09:10

## 2021-10-11 RX ADMIN — PIPERACILLIN SODIUM AND TAZOBACTAM SODIUM 4.5 G: 4; .5 INJECTION, POWDER, FOR SOLUTION INTRAVENOUS at 04:10

## 2021-10-11 RX ADMIN — PHENYLEPHRINE HYDROCHLORIDE 100 MCG: 10 INJECTION INTRAVENOUS at 03:10

## 2021-10-11 RX ADMIN — POTASSIUM PHOSPHATE, MONOBASIC AND POTASSIUM PHOSPHATE, DIBASIC 20 MMOL: 224; 236 INJECTION, SOLUTION, CONCENTRATE INTRAVENOUS at 09:10

## 2021-10-11 RX ADMIN — PIPERACILLIN SODIUM AND TAZOBACTAM SODIUM 4.5 G: 4; .5 INJECTION, POWDER, FOR SOLUTION INTRAVENOUS at 12:10

## 2021-10-11 RX ADMIN — PIPERACILLIN SODIUM AND TAZOBACTAM SODIUM 4.5 G: 4; .5 INJECTION, POWDER, FOR SOLUTION INTRAVENOUS at 11:10

## 2021-10-11 RX ADMIN — LIDOCAINE HYDROCHLORIDE 100 MG: 20 INJECTION, SOLUTION INTRAVENOUS at 01:10

## 2021-10-11 RX ADMIN — OXYCODONE 5 MG: 5 TABLET ORAL at 05:10

## 2021-10-11 RX ADMIN — ONDANSETRON HYDROCHLORIDE 4 MG: 2 INJECTION INTRAMUSCULAR; INTRAVENOUS at 04:10

## 2021-10-11 RX ADMIN — SODIUM CHLORIDE: 0.9 INJECTION, SOLUTION INTRAVENOUS at 10:10

## 2021-10-11 RX ADMIN — ROCURONIUM BROMIDE 50 MG: 10 SOLUTION INTRAVENOUS at 01:10

## 2021-10-11 RX ADMIN — FENTANYL CITRATE 100 MCG: 50 INJECTION, SOLUTION INTRAMUSCULAR; INTRAVENOUS at 01:10

## 2021-10-11 RX ADMIN — ROCURONIUM BROMIDE 15 MG: 10 SOLUTION INTRAVENOUS at 03:10

## 2021-10-11 NOTE — PLAN OF CARE
10/10/21 2226   Discharge Planning   Assessment Type Discharge Planning Brief Assessment   Resource/Environmental Concerns none   Support Systems Family members   Equipment Currently Used at Home none   Current Living Arrangements home/apartment/condo   Patient/Family Anticipates Transition to home   Patient/Family Anticipated Services at Transition none   DME Needed Upon Discharge  none   Discharge Plan A Home   Discharge Plan B Home with family;Home Health     Yusuf Marie #[id number] (CSN: [id number]) (60 y.o. M) (Adm: 10/09/21)  [facility] [id number] RICHIE -    RN UM spoke with patient, pt was Transferred from [facility]   Patient  listed self pay, patient  says he has [employer]  ins  RN CM informed registration to f/u   Patient wants list of  pcp @ [facility] ( wants list to choose from for a new pcp)     Patient open to [facility] or here [facility]   Patient states have support no other needs    ROSA Begum RN  Assisted Tiana THRASHER Dc planner with dc assess today   ER Case management 10/10/2021  # [phone] (FAX) [phone]

## 2021-10-11 NOTE — SUBJECTIVE & OBJECTIVE
Interval History: s/p lap dell, tolerating clears, + flatus    Review of Systems   Constitutional: Negative for fatigue and fever.   Respiratory: Negative for cough, shortness of breath and wheezing.    Cardiovascular: Negative for chest pain, palpitations and leg swelling.   Gastrointestinal: Positive for abdominal pain (much improved). Negative for constipation, diarrhea, nausea and vomiting.   Genitourinary: Negative for difficulty urinating.   Musculoskeletal: Negative for gait problem.   Psychiatric/Behavioral: Negative for agitation and confusion.     Objective:     Vital Signs (Most Recent):  Temp: 99.4 °F (37.4 °C) (10/11/21 0705)  Pulse: 86 (10/11/21 0705)  Resp: 16 (10/11/21 0705)  BP: (!) 120/58 (10/11/21 0705)  SpO2: 96 % (10/11/21 0705) Vital Signs (24h Range):  Temp:  [98.3 °F (36.8 °C)-101.5 °F (38.6 °C)] 99.4 °F (37.4 °C)  Pulse:  [] 86  Resp:  [16-18] 16  SpO2:  [96 %] 96 %  BP: (118-140)/(58-84) 120/58     Weight: 111.3 kg (245 lb 6 oz)  Body mass index is 34.22 kg/m².    Intake/Output Summary (Last 24 hours) at 10/11/2021 1042  Last data filed at 10/11/2021 0636  Gross per 24 hour   Intake 1879.94 ml   Output 400 ml   Net 1479.94 ml      Physical Exam  Vitals and nursing note reviewed.   Constitutional:       General: He is not in acute distress.     Appearance: Normal appearance. He is not ill-appearing.   Cardiovascular:      Rate and Rhythm: Normal rate and regular rhythm.      Pulses: Normal pulses.      Heart sounds: Normal heart sounds. No murmur heard.     Pulmonary:      Effort: Pulmonary effort is normal.      Breath sounds: Normal breath sounds. No wheezing or rales.   Abdominal:      General: Bowel sounds are normal. There is no distension.      Palpations: Abdomen is soft.      Tenderness: There is abdominal tenderness (TTP incision sites).      Comments: Drain noted with bloody fluid   Musculoskeletal:      Right lower leg: No edema.      Left lower leg: No edema.   Skin:      General: Skin is warm and dry.   Neurological:      General: No focal deficit present.      Mental Status: He is alert and oriented to person, place, and time. Mental status is at baseline.   Psychiatric:         Behavior: Behavior normal.         Thought Content: Thought content normal.         Significant Labs:   All pertinent labs within the past 24 hours have been reviewed.  CBC:   Recent Labs   Lab 10/09/21  2128 10/10/21  0326 10/11/21  0414   WBC 16.87* 15.30* 10.59   HGB 14.3 13.5* 12.6*   HCT 43.8 41.9 39.2*    191 191     CMP:   Recent Labs   Lab 10/09/21  2128 10/10/21  0326 10/11/21  0414    138 139   K 4.0 3.9 3.7    106 108   CO2 21* 21* 21*   * 113* 88   BUN 6* 7* 9   CREATININE 0.8 0.8 0.8   CALCIUM 9.0 9.0 8.8   PROT 7.0 6.7 6.5   ALBUMIN 3.4* 3.1* 2.8*   BILITOT 1.6* 2.1* 1.4*   ALKPHOS 50* 79 77   AST 19 91* 36   ALT 16 74* 58*   ANIONGAP 10 11 10   EGFRNONAA >60 >60 >60

## 2021-10-11 NOTE — OP NOTE
[facility]  Surgery Department  Operative Note    SUMMARY     Patient: Yusuf Marie    Medical Record: [id number]    Date of Procedure: 10/11/2021     Surgeon: Surgeon(s) and Role:     * Ronny Pineda Jr., MD - Primary    Assisting Surgeon: None    Pre-Operative Diagnosis: Calculus of gallbladder without cholecystitis without obstruction [K80.20]    Post-Operative Diagnosis: Post-Op Diagnosis Codes:     * Calculus of gallbladder without cholecystitis without obstruction [K80.20]    Procedure: Procedure(s) (LRB):  CHOLECYSTECTOMY, LAPAROSCOPIC (N/A)    Procedure in Detail:  The patient was brought to the operating room and placed in the supine position, the abdomen prepped and draped in a sterile fashion.  A small incision made in the left upper quadrant, a Veress needle inserted, pneumoperitoneum achieved.  A 5 mm Optiview trocar inserted in the right upper quadrant and midclavicular line.  Under direct observation 2 additional 5 mm trocars were inserted:  The 1st in the anterior axillary line, 2nd in the upper midline.  A 10 mm trocar inserted under direct observation approximately 5 cm above the umbilicus in the midline.  The gallbladder visualized and seen to be acute and chronically inflamed.  Using blunt dissection adhesions to the gallbladder were taken down.  The gallbladder was grasped with ratcheted retractors placed in the lateral 5 mm ports.  Using blunt dissection the cystic artery and duct were isolated and doubly clamped proximally.  They were then transected.  With the use of the cautery the gallbladder was removed from the liver bed.  Under direct observation with the aid of an Endo-Catch the gallbladder was removed through the periumbilical port.  A 15 Delfino drain placed in the subhepatic space and brought through a separate stab wound.  It was secured with 3-0 nylon.  The peritoneal cavity irrigated and inspected.  The pneumoperitoneum released.  The fascia at the umbilicus  closed with 0 Vicryl and the skin with 4-0 Monocryl.  The patient tolerated the procedure well and left the operating room in good condition.  At the end of procedure all sponge lap instrument counts were correct.  Estimated blood loss 600 cc.

## 2021-10-11 NOTE — PLAN OF CARE
Patient is AAOx4. Patient denied pain during the night. Patient NPO at midnight per orders. Ambulates independently. CHG bath administered in preparation for surgery in AM. Patient resting comfortably at present. Bed locked in lowest position with side rails up x 2. Call light within reach of patient. Will continue purposeful rounding.

## 2021-10-11 NOTE — PROGRESS NOTES
"[facility] Hospital Medicine  Progress Note    Patient Name: Yusuf Marie  MRN: [id number]  Patient Class: IP- Inpatient   Admission Date: 10/9/2021  Length of Stay: 2 days  Attending Physician: Carole Sorenson MD  Primary Care Provider: Primary Doctor No        Subjective:     Principal Problem:Cholecystitis        HPI:  Per Skylre Bradley, NP:  "The patient is a 60 year old male with no known past medical history who presented to the  ED with postprandial abd pain x 3 days, diffuse bilateral upper abdominal pain with nausea and vomiting x 1 and the emesis contained small blood clots.  In ED, VSS.  Pain 9/10. RUQ U/S: cholecytitis, +cholelithiasis with thickened GB wall (4.0 mm) and trace pericholecystic fluid, distended with 1.8cm stone at gallbladder neck, CBD wnl 3.4mm. Incidental probable hepatic hemangiomas (nonshadowing echogenic liver lesions). CT A/p with IV cont: GB distended with wall thickening, perichole fluid, 2.3cm stone near GB neck, c/w cholecystitis.  The patient was transferred to [facility] due to insurance concerns for cholecystitis and General Surgery consult."      Overview/Hospital Course:  59 y/o male with no PMH admitted for cholecystitis due to cholelithiasis.      Interval History: No events overnight.  Mr. Marie says he is doing fine this morning.  He did not require p.r.n. pain medication yesterday.  Tolerated clear liquid diet yesterday, but was made NPO at midnight for OR today.    Review of Systems   Constitutional: Negative for fatigue and fever.   Respiratory: Negative for cough, shortness of breath and wheezing.    Cardiovascular: Negative for chest pain, palpitations and leg swelling.   Gastrointestinal: Negative for abdominal pain, constipation, diarrhea, nausea and vomiting.   Genitourinary: Negative for difficulty urinating.   Musculoskeletal: Negative for gait problem.   Psychiatric/Behavioral: Negative for agitation and confusion.     Objective: "     Vital Signs (Most Recent):  Temp: 99.4 °F (37.4 °C) (10/11/21 0705)  Pulse: 86 (10/11/21 0705)  Resp: 16 (10/11/21 0705)  BP: (!) 120/58 (10/11/21 0705)  SpO2: 96 % (10/11/21 0705) Vital Signs (24h Range):  Temp:  [98.3 °F (36.8 °C)-101.5 °F (38.6 °C)] 99.4 °F (37.4 °C)  Pulse:  [] 86  Resp:  [16-18] 16  SpO2:  [96 %] 96 %  BP: (118-140)/(58-84) 120/58     Weight: 111.3 kg (245 lb 6 oz)  Body mass index is 34.22 kg/m².    Intake/Output Summary (Last 24 hours) at 10/11/2021 1042  Last data filed at 10/11/2021 0636  Gross per 24 hour   Intake 1879.94 ml   Output 400 ml   Net 1479.94 ml      Physical Exam  Vitals and nursing note reviewed.   Constitutional:       General: He is not in acute distress.     Appearance: Normal appearance. He is not ill-appearing.   Cardiovascular:      Rate and Rhythm: Normal rate and regular rhythm.      Pulses: Normal pulses.      Heart sounds: Normal heart sounds. No murmur heard.     Pulmonary:      Effort: Pulmonary effort is normal.      Breath sounds: Normal breath sounds. No wheezing or rales.   Abdominal:      General: Bowel sounds are normal. There is no distension.      Palpations: Abdomen is soft.      Tenderness: There is no abdominal tenderness.   Musculoskeletal:      Right lower leg: No edema.      Left lower leg: No edema.   Skin:     General: Skin is warm and dry.   Neurological:      General: No focal deficit present.      Mental Status: He is alert and oriented to person, place, and time. Mental status is at baseline.   Psychiatric:         Behavior: Behavior normal.         Thought Content: Thought content normal.         Significant Labs:   All pertinent labs within the past 24 hours have been reviewed.  CBC:   Recent Labs   Lab 10/09/21  2128 10/10/21  0326 10/11/21  0414   WBC 16.87* 15.30* 10.59   HGB 14.3 13.5* 12.6*   HCT 43.8 41.9 39.2*    191 191     CMP:   Recent Labs   Lab 10/09/21  2128 10/10/21  0326 10/11/21  0414    138 139   K 4.0  3.9 3.7    106 108   CO2 21* 21* 21*   * 113* 88   BUN 6* 7* 9   CREATININE 0.8 0.8 0.8   CALCIUM 9.0 9.0 8.8   PROT 7.0 6.7 6.5   ALBUMIN 3.4* 3.1* 2.8*   BILITOT 1.6* 2.1* 1.4*   ALKPHOS 50* 79 77   AST 19 91* 36   ALT 16 74* 58*   ANIONGAP 10 11 10   EGFRNONAA >60 >60 >60       Significant Imaging: I have reviewed all pertinent imaging results/findings within the past 24 hours.      Assessment/Plan:      * Cholecystitis  - RUQ US (done at , report in chart) with cholecytitis, +cholelithiasis with thickened GB wall (4.0 mm) and trace pericholecystic fluid, distended with 1.8cm stone at gallbladder neck, CBD wnl 3.4mm.  - CT A/P (done at , report in chart) also consistent with cholecystitis  - continue Zosyn (started 10/9)  - NPO  - IVFs, replace electrolytes prn  - pain control  - General surgery following: plan for cholecystectomy today          VTE Risk Mitigation (From admission, onward)         Ordered     enoxaparin injection 40 mg  Daily         10/10/21 1116     IP VTE HIGH RISK PATIENT  Once         10/09/21 2044     Place sequential compression device  Until discontinued         10/09/21 2044                Discharge Planning   OSCAR:      Code Status: Full Code   Is the patient medically ready for discharge?:     Reason for patient still in hospital (select all that apply): Treatment and Consult recommendations  Discharge Plan A: Home                  Makenna Trejo PA-C  Department of Hospital Medicine   [facility]

## 2021-10-12 LAB
ALBUMIN SERPL BCP-MCNC: 3.4 G/DL (ref 3.5–5.2)
ALP SERPL-CCNC: 64 U/L (ref 55–135)
ALT SERPL W/O P-5'-P-CCNC: 61 U/L (ref 10–44)
ANION GAP SERPL CALC-SCNC: 8 MMOL/L (ref 8–16)
AST SERPL-CCNC: 57 U/L (ref 10–40)
BASOPHILS # BLD AUTO: 0.01 K/UL (ref 0–0.2)
BASOPHILS NFR BLD: 0.1 % (ref 0–1.9)
BILIRUB SERPL-MCNC: 0.9 MG/DL (ref 0.1–1)
BUN SERPL-MCNC: 7 MG/DL (ref 8–23)
CALCIUM SERPL-MCNC: 9.1 MG/DL (ref 8.7–10.5)
CHLORIDE SERPL-SCNC: 109 MMOL/L (ref 95–110)
CO2 SERPL-SCNC: 23 MMOL/L (ref 23–29)
CREAT SERPL-MCNC: 0.7 MG/DL (ref 0.5–1.4)
DIFFERENTIAL METHOD: ABNORMAL
EOSINOPHIL # BLD AUTO: 0 K/UL (ref 0–0.5)
EOSINOPHIL NFR BLD: 0 % (ref 0–8)
ERYTHROCYTE [DISTWIDTH] IN BLOOD BY AUTOMATED COUNT: 12.6 % (ref 11.5–14.5)
EST. GFR  (AFRICAN AMERICAN): >60 ML/MIN/1.73 M^2
EST. GFR  (NON AFRICAN AMERICAN): >60 ML/MIN/1.73 M^2
GLUCOSE SERPL-MCNC: 131 MG/DL (ref 70–110)
HCT VFR BLD AUTO: 33.5 % (ref 40–54)
HGB BLD-MCNC: 10.6 G/DL (ref 14–18)
IMM GRANULOCYTES # BLD AUTO: 0.05 K/UL (ref 0–0.04)
IMM GRANULOCYTES NFR BLD AUTO: 0.5 % (ref 0–0.5)
LYMPHOCYTES # BLD AUTO: 0.5 K/UL (ref 1–4.8)
LYMPHOCYTES NFR BLD: 4.4 % (ref 18–48)
MAGNESIUM SERPL-MCNC: 2.1 MG/DL (ref 1.6–2.6)
MCH RBC QN AUTO: 29.2 PG (ref 27–31)
MCHC RBC AUTO-ENTMCNC: 31.6 G/DL (ref 32–36)
MCV RBC AUTO: 92 FL (ref 82–98)
MONOCYTES # BLD AUTO: 0.5 K/UL (ref 0.3–1)
MONOCYTES NFR BLD: 4.7 % (ref 4–15)
NEUTROPHILS # BLD AUTO: 9.3 K/UL (ref 1.8–7.7)
NEUTROPHILS NFR BLD: 90.3 % (ref 38–73)
NRBC BLD-RTO: 0 /100 WBC
PHOSPHATE SERPL-MCNC: 2.2 MG/DL (ref 2.7–4.5)
PLATELET # BLD AUTO: 196 K/UL (ref 150–450)
PMV BLD AUTO: 10.3 FL (ref 9.2–12.9)
POTASSIUM SERPL-SCNC: 4.1 MMOL/L (ref 3.5–5.1)
PROT SERPL-MCNC: 6.9 G/DL (ref 6–8.4)
RBC # BLD AUTO: 3.63 M/UL (ref 4.6–6.2)
SODIUM SERPL-SCNC: 140 MMOL/L (ref 136–145)
WBC # BLD AUTO: 10.34 K/UL (ref 3.9–12.7)

## 2021-10-12 PROCEDURE — 99233 SBSQ HOSP IP/OBS HIGH 50: CPT | Mod: ,,, | Performed by: PHYSICIAN ASSISTANT

## 2021-10-12 PROCEDURE — 96372 THER/PROPH/DIAG INJ SC/IM: CPT | Mod: 59 | Performed by: HOSPITALIST

## 2021-10-12 PROCEDURE — 63600175 PHARM REV CODE 636 W HCPCS: Performed by: PHYSICIAN ASSISTANT

## 2021-10-12 PROCEDURE — 80053 COMPREHEN METABOLIC PANEL: CPT | Performed by: NURSE PRACTITIONER

## 2021-10-12 PROCEDURE — G0378 HOSPITAL OBSERVATION PER HR: HCPCS

## 2021-10-12 PROCEDURE — 85025 COMPLETE CBC W/AUTO DIFF WBC: CPT | Performed by: NURSE PRACTITIONER

## 2021-10-12 PROCEDURE — 84100 ASSAY OF PHOSPHORUS: CPT | Performed by: NURSE PRACTITIONER

## 2021-10-12 PROCEDURE — 94761 N-INVAS EAR/PLS OXIMETRY MLT: CPT

## 2021-10-12 PROCEDURE — 25000003 PHARM REV CODE 250: Performed by: NURSE PRACTITIONER

## 2021-10-12 PROCEDURE — 11000001 HC ACUTE MED/SURG PRIVATE ROOM

## 2021-10-12 PROCEDURE — 63600175 PHARM REV CODE 636 W HCPCS: Performed by: NURSE PRACTITIONER

## 2021-10-12 PROCEDURE — 83735 ASSAY OF MAGNESIUM: CPT | Performed by: NURSE PRACTITIONER

## 2021-10-12 PROCEDURE — 99233 PR SUBSEQUENT HOSPITAL CARE,LEVL III: ICD-10-PCS | Mod: ,,, | Performed by: PHYSICIAN ASSISTANT

## 2021-10-12 PROCEDURE — 36415 COLL VENOUS BLD VENIPUNCTURE: CPT | Performed by: NURSE PRACTITIONER

## 2021-10-12 PROCEDURE — 96361 HYDRATE IV INFUSION ADD-ON: CPT | Performed by: HOSPITALIST

## 2021-10-12 PROCEDURE — 96376 TX/PRO/DX INJ SAME DRUG ADON: CPT | Performed by: HOSPITALIST

## 2021-10-12 PROCEDURE — 96366 THER/PROPH/DIAG IV INF ADDON: CPT | Performed by: HOSPITALIST

## 2021-10-12 RX ADMIN — MUPIROCIN: 20 OINTMENT TOPICAL at 08:10

## 2021-10-12 RX ADMIN — MUPIROCIN: 20 OINTMENT TOPICAL at 10:10

## 2021-10-12 RX ADMIN — ACETAMINOPHEN 650 MG: 325 TABLET ORAL at 08:10

## 2021-10-12 RX ADMIN — PIPERACILLIN SODIUM AND TAZOBACTAM SODIUM 4.5 G: 4; .5 INJECTION, POWDER, FOR SOLUTION INTRAVENOUS at 04:10

## 2021-10-12 RX ADMIN — PIPERACILLIN SODIUM AND TAZOBACTAM SODIUM 4.5 G: 4; .5 INJECTION, POWDER, FOR SOLUTION INTRAVENOUS at 08:10

## 2021-10-12 RX ADMIN — ENOXAPARIN SODIUM 40 MG: 100 INJECTION SUBCUTANEOUS at 04:10

## 2021-10-12 NOTE — NURSING
Patient received from PACU. Ambulatory with assistance from stretcher to bed.  Tolerated fairly well. Opsites noted to abd x3 with JPx1 to RUQ draining a scant amount of bright red blood.  Pain 10/10. Denies any nausea at this time. Op site to navel with oozing small amount of blood coming from bandaid. Site reinforced with 4x4x2 and covered with tape.  Will continue monitoring.

## 2021-10-12 NOTE — PROGRESS NOTES
VS stable on 2L oxygen via NC in PACU. Patient resting comfortably, reports pain is tolerable at this time. Bandaid placed to umbilical lap site due to scant bloody drainage, will continue to monitor. Report given to PRICE Spears on 3S. Transported to room 346 via stretcher, safety precautions in place.

## 2021-10-12 NOTE — PROGRESS NOTES
"[facility] Hospital Medicine  Progress Note    Patient Name: Yusuf Marie  MRN: [id number]  Patient Class: IP- Inpatient   Admission Date: 10/9/2021  Length of Stay: 3 days  Attending Physician: Caitlin Childs MD  Primary Care Provider: Primary Doctor No        Subjective:     Principal Problem:Cholecystitis        HPI:  Per Skyler Bradley, NP:  "The patient is a 60 year old male with no known past medical history who presented to the  ED with postprandial abd pain x 3 days, diffuse bilateral upper abdominal pain with nausea and vomiting x 1 and the emesis contained small blood clots.  In ED, VSS.  Pain 9/10. RUQ U/S: cholecytitis, +cholelithiasis with thickened GB wall (4.0 mm) and trace pericholecystic fluid, distended with 1.8cm stone at gallbladder neck, CBD wnl 3.4mm. Incidental probable hepatic hemangiomas (nonshadowing echogenic liver lesions). CT A/p with IV cont: GB distended with wall thickening, perichole fluid, 2.3cm stone near GB neck, c/w cholecystitis.  The patient was transferred to [facility] due to insurance concerns for cholecystitis and General Surgery consult."      Overview/Hospital Course:  59 y/o male with no PMH admitted for cholecystitis due to cholelithiasis.      Interval History: s/p lap dell, tolerating clears, + flatus    Review of Systems   Constitutional: Negative for fatigue and fever.   Respiratory: Negative for cough, shortness of breath and wheezing.    Cardiovascular: Negative for chest pain, palpitations and leg swelling.   Gastrointestinal: Positive for abdominal pain (much improved). Negative for constipation, diarrhea, nausea and vomiting.   Genitourinary: Negative for difficulty urinating.   Musculoskeletal: Negative for gait problem.   Psychiatric/Behavioral: Negative for agitation and confusion.     Objective:     Vital Signs (Most Recent):  Temp: 99.4 °F (37.4 °C) (10/11/21 0705)  Pulse: 86 (10/11/21 0705)  Resp: 16 (10/11/21 0705)  BP: (!) 120/58 " (10/11/21 0705)  SpO2: 96 % (10/11/21 0705) Vital Signs (24h Range):  Temp:  [98.3 °F (36.8 °C)-101.5 °F (38.6 °C)] 99.4 °F (37.4 °C)  Pulse:  [] 86  Resp:  [16-18] 16  SpO2:  [96 %] 96 %  BP: (118-140)/(58-84) 120/58     Weight: 111.3 kg (245 lb 6 oz)  Body mass index is 34.22 kg/m².    Intake/Output Summary (Last 24 hours) at 10/11/2021 1042  Last data filed at 10/11/2021 0636  Gross per 24 hour   Intake 1879.94 ml   Output 400 ml   Net 1479.94 ml      Physical Exam  Vitals and nursing note reviewed.   Constitutional:       General: He is not in acute distress.     Appearance: Normal appearance. He is not ill-appearing.   Cardiovascular:      Rate and Rhythm: Normal rate and regular rhythm.      Pulses: Normal pulses.      Heart sounds: Normal heart sounds. No murmur heard.     Pulmonary:      Effort: Pulmonary effort is normal.      Breath sounds: Normal breath sounds. No wheezing or rales.   Abdominal:      General: Bowel sounds are normal. There is no distension.      Palpations: Abdomen is soft.      Tenderness: There is abdominal tenderness (TTP incision sites).      Comments: Drain noted with bloody fluid   Musculoskeletal:      Right lower leg: No edema.      Left lower leg: No edema.   Skin:     General: Skin is warm and dry.   Neurological:      General: No focal deficit present.      Mental Status: He is alert and oriented to person, place, and time. Mental status is at baseline.   Psychiatric:         Behavior: Behavior normal.         Thought Content: Thought content normal.         Significant Labs:   All pertinent labs within the past 24 hours have been reviewed.  CBC:   Recent Labs   Lab 10/09/21  2128 10/10/21  0326 10/11/21  0414   WBC 16.87* 15.30* 10.59   HGB 14.3 13.5* 12.6*   HCT 43.8 41.9 39.2*    191 191     CMP:   Recent Labs   Lab 10/09/21  2128 10/10/21  0326 10/11/21  0414    138 139   K 4.0 3.9 3.7    106 108   CO2 21* 21* 21*   * 113* 88   BUN 6* 7* 9    CREATININE 0.8 0.8 0.8   CALCIUM 9.0 9.0 8.8   PROT 7.0 6.7 6.5   ALBUMIN 3.4* 3.1* 2.8*   BILITOT 1.6* 2.1* 1.4*   ALKPHOS 50* 79 77   AST 19 91* 36   ALT 16 74* 58*   ANIONGAP 10 11 10   EGFRNONAA >60 >60 >60                 Assessment/Plan:      * Cholecystitis  - RUQ US (done at , report in chart) with cholecytitis, +cholelithiasis with thickened GB wall (4.0 mm) and trace pericholecystic fluid, distended with 1.8cm stone at gallbladder neck, CBD wnl 3.4mm.  - CT A/P (done at , report in chart) also consistent with cholecystitis  - continue Zosyn (started 10/9)  - IVFs, replace electrolytes prn  - pain control  - cholecystectomy POD#1  - tolerating clears  - d/w surg         VTE Risk Mitigation (From admission, onward)         Ordered     enoxaparin injection 40 mg  Daily         10/10/21 1116     IP VTE HIGH RISK PATIENT  Once         10/09/21 2044     Place sequential compression device  Until discontinued         10/09/21 2044                Discharge Planning   OSCAR:      Code Status: Full Code   Is the patient medically ready for discharge?:     Reason for patient still in hospital (select all that apply): Patient trending condition, Treatment and Consult recommendations  Discharge Plan A: Home                  Laura Teran PA-C  Department of Hospital Medicine   [facility]

## 2021-10-12 NOTE — ASSESSMENT & PLAN NOTE
- RUQ US (done at , report in chart) with cholecytitis, +cholelithiasis with thickened GB wall (4.0 mm) and trace pericholecystic fluid, distended with 1.8cm stone at gallbladder neck, CBD wnl 3.4mm.  - CT A/P (done at , report in chart) also consistent with cholecystitis  - continue Zosyn (started 10/9)  - IVFs, replace electrolytes prn  - pain control  - cholecystectomy POD#1  - tolerating clears  - d/w surg

## 2021-10-12 NOTE — PLAN OF CARE
Patient is AAOx4. Patient reports gas pains overnight. VSS on 2L NC. Patient tolerating PO intake at present. LAURIE drain care performed. Patient resting comfortably at present. Bed locked in lowest position with side rails up x 2. Call light within reach of patient. Will continue purposeful rounding.

## 2021-10-13 VITALS
HEART RATE: 97 BPM | RESPIRATION RATE: 18 BRPM | OXYGEN SATURATION: 100 % | HEIGHT: 71 IN | WEIGHT: 245.38 LBS | DIASTOLIC BLOOD PRESSURE: 75 MMHG | TEMPERATURE: 99 F | BODY MASS INDEX: 34.35 KG/M2 | SYSTOLIC BLOOD PRESSURE: 126 MMHG

## 2021-10-13 LAB
ALBUMIN SERPL BCP-MCNC: 2.9 G/DL (ref 3.5–5.2)
ALP SERPL-CCNC: 56 U/L (ref 55–135)
ALT SERPL W/O P-5'-P-CCNC: 41 U/L (ref 10–44)
ANION GAP SERPL CALC-SCNC: 7 MMOL/L (ref 8–16)
AST SERPL-CCNC: 29 U/L (ref 10–40)
BASOPHILS # BLD AUTO: 0.01 K/UL (ref 0–0.2)
BASOPHILS NFR BLD: 0.1 % (ref 0–1.9)
BILIRUB SERPL-MCNC: 0.8 MG/DL (ref 0.1–1)
BUN SERPL-MCNC: 8 MG/DL (ref 8–23)
CALCIUM SERPL-MCNC: 8.2 MG/DL (ref 8.7–10.5)
CHLORIDE SERPL-SCNC: 109 MMOL/L (ref 95–110)
CO2 SERPL-SCNC: 24 MMOL/L (ref 23–29)
CREAT SERPL-MCNC: 0.7 MG/DL (ref 0.5–1.4)
DIFFERENTIAL METHOD: ABNORMAL
EOSINOPHIL # BLD AUTO: 0 K/UL (ref 0–0.5)
EOSINOPHIL NFR BLD: 0.4 % (ref 0–8)
ERYTHROCYTE [DISTWIDTH] IN BLOOD BY AUTOMATED COUNT: 12.5 % (ref 11.5–14.5)
EST. GFR  (AFRICAN AMERICAN): >60 ML/MIN/1.73 M^2
EST. GFR  (NON AFRICAN AMERICAN): >60 ML/MIN/1.73 M^2
GLUCOSE SERPL-MCNC: 97 MG/DL (ref 70–110)
HCT VFR BLD AUTO: 29.1 % (ref 40–54)
HGB BLD-MCNC: 9.5 G/DL (ref 14–18)
IMM GRANULOCYTES # BLD AUTO: 0.04 K/UL (ref 0–0.04)
IMM GRANULOCYTES NFR BLD AUTO: 0.5 % (ref 0–0.5)
LYMPHOCYTES # BLD AUTO: 1.3 K/UL (ref 1–4.8)
LYMPHOCYTES NFR BLD: 15.9 % (ref 18–48)
MAGNESIUM SERPL-MCNC: 1.9 MG/DL (ref 1.6–2.6)
MCH RBC QN AUTO: 29.8 PG (ref 27–31)
MCHC RBC AUTO-ENTMCNC: 32.6 G/DL (ref 32–36)
MCV RBC AUTO: 91 FL (ref 82–98)
MONOCYTES # BLD AUTO: 0.8 K/UL (ref 0.3–1)
MONOCYTES NFR BLD: 10.6 % (ref 4–15)
NEUTROPHILS # BLD AUTO: 5.7 K/UL (ref 1.8–7.7)
NEUTROPHILS NFR BLD: 72.5 % (ref 38–73)
NRBC BLD-RTO: 0 /100 WBC
PHOSPHATE SERPL-MCNC: 1.6 MG/DL (ref 2.7–4.5)
PLATELET # BLD AUTO: 189 K/UL (ref 150–450)
PMV BLD AUTO: 10 FL (ref 9.2–12.9)
POTASSIUM SERPL-SCNC: 3.5 MMOL/L (ref 3.5–5.1)
PROT SERPL-MCNC: 5.8 G/DL (ref 6–8.4)
RBC # BLD AUTO: 3.19 M/UL (ref 4.6–6.2)
SODIUM SERPL-SCNC: 140 MMOL/L (ref 136–145)
WBC # BLD AUTO: 7.9 K/UL (ref 3.9–12.7)

## 2021-10-13 PROCEDURE — 36415 COLL VENOUS BLD VENIPUNCTURE: CPT | Performed by: NURSE PRACTITIONER

## 2021-10-13 PROCEDURE — 84100 ASSAY OF PHOSPHORUS: CPT | Performed by: NURSE PRACTITIONER

## 2021-10-13 PROCEDURE — 85025 COMPLETE CBC W/AUTO DIFF WBC: CPT | Performed by: NURSE PRACTITIONER

## 2021-10-13 PROCEDURE — 96376 TX/PRO/DX INJ SAME DRUG ADON: CPT | Performed by: HOSPITALIST

## 2021-10-13 PROCEDURE — 83735 ASSAY OF MAGNESIUM: CPT | Performed by: NURSE PRACTITIONER

## 2021-10-13 PROCEDURE — 80053 COMPREHEN METABOLIC PANEL: CPT | Performed by: NURSE PRACTITIONER

## 2021-10-13 PROCEDURE — 25000003 PHARM REV CODE 250: Performed by: NURSE PRACTITIONER

## 2021-10-13 PROCEDURE — 25000003 PHARM REV CODE 250: Performed by: PHYSICIAN ASSISTANT

## 2021-10-13 PROCEDURE — 96361 HYDRATE IV INFUSION ADD-ON: CPT | Performed by: HOSPITALIST

## 2021-10-13 PROCEDURE — 63600175 PHARM REV CODE 636 W HCPCS: Performed by: PHYSICIAN ASSISTANT

## 2021-10-13 PROCEDURE — 99239 HOSP IP/OBS DSCHRG MGMT >30: CPT | Mod: ,,, | Performed by: PHYSICIAN ASSISTANT

## 2021-10-13 PROCEDURE — 63600175 PHARM REV CODE 636 W HCPCS: Performed by: NURSE PRACTITIONER

## 2021-10-13 PROCEDURE — G0378 HOSPITAL OBSERVATION PER HR: HCPCS

## 2021-10-13 PROCEDURE — 94760 N-INVAS EAR/PLS OXIMETRY 1: CPT

## 2021-10-13 PROCEDURE — 99239 PR HOSPITAL DISCHARGE DAY,>30 MIN: ICD-10-PCS | Mod: ,,, | Performed by: PHYSICIAN ASSISTANT

## 2021-10-13 RX ORDER — SODIUM,POTASSIUM PHOSPHATES 280-250MG
1 POWDER IN PACKET (EA) ORAL
Status: DISCONTINUED | OUTPATIENT
Start: 2021-10-13 | End: 2021-10-13 | Stop reason: HOSPADM

## 2021-10-13 RX ORDER — OXYCODONE HYDROCHLORIDE 5 MG/1
5 TABLET ORAL EVERY 6 HOURS PRN
Qty: 20 TABLET | Refills: 0 | Status: SHIPPED | OUTPATIENT
Start: 2021-10-13 | End: 2022-09-28

## 2021-10-13 RX ADMIN — ENOXAPARIN SODIUM 40 MG: 100 INJECTION SUBCUTANEOUS at 04:10

## 2021-10-13 RX ADMIN — PIPERACILLIN SODIUM AND TAZOBACTAM SODIUM 4.5 G: 4; .5 INJECTION, POWDER, FOR SOLUTION INTRAVENOUS at 12:10

## 2021-10-13 RX ADMIN — POTASSIUM & SODIUM PHOSPHATES POWDER PACK 280-160-250 MG 1 PACKET: 280-160-250 PACK at 08:10

## 2021-10-13 RX ADMIN — ACETAMINOPHEN 650 MG: 325 TABLET ORAL at 02:10

## 2021-10-13 RX ADMIN — MUPIROCIN: 20 OINTMENT TOPICAL at 08:10

## 2021-10-13 RX ADMIN — PIPERACILLIN SODIUM AND TAZOBACTAM SODIUM 4.5 G: 4; .5 INJECTION, POWDER, FOR SOLUTION INTRAVENOUS at 08:10

## 2021-10-13 RX ADMIN — POTASSIUM & SODIUM PHOSPHATES POWDER PACK 280-160-250 MG 1 PACKET: 280-160-250 PACK at 04:10

## 2021-10-13 NOTE — PLAN OF CARE
10/13/21 1636   Discharge Reassessment   Assessment Type Discharge Planning Reassessment   Did the patient's condition or plan change since previous assessment? Yes   Discharge Plan discussed with: Patient   Discharge Plan A Home with family   Discharge Plan B Home with family   DME Needed Upon Discharge  none   Discharge Barriers Identified None   Post-Acute Status   Hospital Resources/Appts/Education Provided Appointments scheduled by Navigator/Coordinator;Provided patient/caregiver with written discharge plan information   Discharge Delays None known at this time

## 2021-10-13 NOTE — PROGRESS NOTES
Postop day 1.  Status post laparoscopic cholecystectomy for acute and chronic cholecystitis    Subjective  Feeling better  Pain much diminished  Tolerating liquids    PE  HEENT-anicteric  Abdomen-soft, right upper quadrant incisional tenderness, wounds clean and dry

## 2021-10-13 NOTE — DISCHARGE SUMMARY
"[facility]  Hospital Medicine  Discharge Summary      Patient Name: Yusuf Marie  MRN: [id number]  Patient Class: IP- Inpatient  Admission Date: 10/9/2021  Hospital Length of Stay: 4 days  Discharge Date and Time: 10/13/2021  7:37 PM  Attending Physician: No att. providers found   Discharging Provider: Laura Teran PA-C  Primary Care Provider: Primary Doctor Amanda      HPI:   Per Eric Bradley, NP:  "The patient is a 60 year old male with no known past medical history who presented to the  ED with postprandial abd pain x 3 days, diffuse bilateral upper abdominal pain with nausea and vomiting x 1 and the emesis contained small blood clots.  In ED, VSS.  Pain 9/10. RUQ U/S: cholecytitis, +cholelithiasis with thickened GB wall (4.0 mm) and trace pericholecystic fluid, distended with 1.8cm stone at gallbladder neck, CBD wnl 3.4mm. Incidental probable hepatic hemangiomas (nonshadowing echogenic liver lesions). CT A/p with IV cont: GB distended with wall thickening, perichole fluid, 2.3cm stone near GB neck, c/w cholecystitis.  The patient was transferred to [facility] due to insurance concerns for cholecystitis and General Surgery consult."      Procedure(s) (LRB):  CHOLECYSTECTOMY, LAPAROSCOPIC (N/A)      Hospital Course:   59 y/o male with no PMH admitted for cholecystitis due to cholelithiasis. Status post laparoscopic cholecystectomy for acute and chronic cholecystitis.  Tolerating p.o., pain resolved.  Stable for discharge home and outpatient follow-up.  Plan of care discussed with patient who acknowledged understanding and agree with plan.       Goals of Care Treatment Preferences:  Code Status: Full Code      Consults:   Consults (From admission, onward)        Status Ordering Provider     Inpatient consult to General Surgery  Once        Provider:  Ronny Pineda Jr., MD    Completed ERIC BRADLEY          No new Assessment & Plan notes have been filed under this hospital service since " the last note was generated.  Service: Hospital Medicine    Final Active Diagnoses:    Diagnosis Date Noted POA    PRINCIPAL PROBLEM:  Cholecystitis [K81.9] 10/09/2021 Yes      Problems Resolved During this Admission:       Discharged Condition: stable    Disposition: Home or Self Care    Follow Up:   Follow-up Information     Ronny Pineda Jr, MD.    Specialties: Vascular Surgery, General Surgery  Why: post hospital follow-up  Contact information:  [address] [address]  [phone]                       Patient Instructions:      Diet Adult Regular     Notify your health care provider if you experience any of the following:  temperature >100.4     Notify your health care provider if you experience any of the following:  persistent nausea and vomiting or diarrhea     Notify your health care provider if you experience any of the following:  severe uncontrolled pain     Notify your health care provider if you experience any of the following:  redness, tenderness, or signs of infection (pain, swelling, redness, odor or green/yellow discharge around incision site)     Notify your health care provider if you experience any of the following:  difficulty breathing or increased cough     Notify your health care provider if you experience any of the following:  severe persistent headache     Notify your health care provider if you experience any of the following:  persistent dizziness, light-headedness, or visual disturbances     Notify your health care provider if you experience any of the following:  increased confusion or weakness     No driving until:   Order Comments: No driving on opoids     Activity as tolerated       Significant Diagnostic Studies: Labs:   CMP   Recent Labs   Lab 10/12/21  0527 10/13/21  0411    140   K 4.1 3.5    109   CO2 23 24   * 97   BUN 7* 8   CREATININE 0.7 0.7   CALCIUM 9.1 8.2*   PROT 6.9 5.8*   ALBUMIN 3.4* 2.9*   BILITOT 0.9 0.8   ALKPHOS  64 56   AST 57* 29   ALT 61* 41   ANIONGAP 8 7*   ESTGFRAFRICA >60 >60   EGFRNONAA >60 >60    and CBC   Recent Labs   Lab 10/12/21  0527 10/12/21  0527 10/13/21  0411   WBC 10.34  --  7.90   HGB 10.6*  --  9.5*   HCT 33.5*   < > 29.1*     --  189    < > = values in this interval not displayed.       Pending Diagnostic Studies:     Procedure Component Value Units Date/Time    Specimen to Pathology, Surgery General Surgery [[id number]] Collected: 10/11/21 1707    Order Status: Sent Lab Status: In process Updated: 10/11/21 1923         Medications:  Reconciled Home Medications:      Medication List      START taking these medications    oxyCODONE 5 MG immediate release tablet  Commonly known as: ROXICODONE  Take 1 tablet (5 mg total) by mouth every 6 (six) hours as needed for Pain.            Indwelling Lines/Drains at time of discharge:   Lines/Drains/Airways     Drain                 Closed/Suction Drain 10/11/21 Right RUQ Bulb 15 Fr. 2 days                Time spent on the discharge of patient: >30 minutes         Laura Teran PA-C  Department of Hospital Medicine  [facility]

## 2021-10-13 NOTE — PLAN OF CARE
Patient states he lives independently at home.     Family to provide transportation home.    No DC needs from CM perspective.       10/13/21 1522   Final Note   Assessment Type Final Discharge Note   Anticipated Discharge Disposition Home   Hospital Resources/Appts/Education Provided Provided patient/caregiver with written discharge plan information;Appointments scheduled by Navigator/Coordinator   Post-Acute Status   Discharge Delays None known at this time

## 2021-10-13 NOTE — PLAN OF CARE
Patient is AAOx4. Patient reported headache overnight and medications were administered per orders. Lap sites CDI. LAURIE drain to RLQ CDI, Serosanguinous drainage noted. VSS on RA. Patient resting comfortably at present. Bed locked in lowest position with side rails up x 2. Call light within reach of patient. Will continue purposeful rounding.

## 2021-10-14 ENCOUNTER — PATIENT OUTREACH (OUTPATIENT)
Dept: ADMINISTRATIVE | Facility: CLINIC | Age: 61
End: 2021-10-14
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
## 2021-10-19 ENCOUNTER — OFFICE VISIT (OUTPATIENT)
Dept: HOME HEALTH SERVICES | Facility: CLINIC | Age: 61
End: 2021-10-19
Payer: COMMERCIAL

## 2021-10-19 VITALS
HEART RATE: 87 BPM | OXYGEN SATURATION: 100 % | DIASTOLIC BLOOD PRESSURE: 75 MMHG | SYSTOLIC BLOOD PRESSURE: 130 MMHG | RESPIRATION RATE: 20 BRPM | TEMPERATURE: 98 F

## 2021-10-19 DIAGNOSIS — K81.9 CHOLECYSTITIS: Primary | ICD-10-CM

## 2021-10-19 LAB
FINAL PATHOLOGIC DIAGNOSIS: NORMAL
GROSS: NORMAL
Lab: NORMAL

## 2021-10-19 PROCEDURE — 99496 TRANSITIONAL CARE MANAGE SERVICE 7 DAY DISCHARGE: ICD-10-PCS | Mod: S$GLB,,, | Performed by: NURSE PRACTITIONER

## 2021-10-19 PROCEDURE — 99497 PR ADVNCD CARE PLAN 30 MIN: ICD-10-PCS | Mod: S$GLB,,, | Performed by: NURSE PRACTITIONER

## 2021-10-19 PROCEDURE — 3075F PR MOST RECENT SYSTOLIC BLOOD PRESS GE 130-139MM HG: ICD-10-PCS | Mod: CPTII,S$GLB,, | Performed by: NURSE PRACTITIONER

## 2021-10-19 PROCEDURE — 99497 ADVNCD CARE PLAN 30 MIN: CPT | Mod: S$GLB,,, | Performed by: NURSE PRACTITIONER

## 2021-10-19 PROCEDURE — 3078F PR MOST RECENT DIASTOLIC BLOOD PRESSURE < 80 MM HG: ICD-10-PCS | Mod: CPTII,S$GLB,, | Performed by: NURSE PRACTITIONER

## 2021-10-19 PROCEDURE — 3075F SYST BP GE 130 - 139MM HG: CPT | Mod: CPTII,S$GLB,, | Performed by: NURSE PRACTITIONER

## 2021-10-19 PROCEDURE — 99496 TRANSJ CARE MGMT HIGH F2F 7D: CPT | Mod: S$GLB,,, | Performed by: NURSE PRACTITIONER

## 2021-10-19 PROCEDURE — 3078F DIAST BP <80 MM HG: CPT | Mod: CPTII,S$GLB,, | Performed by: NURSE PRACTITIONER

## 2022-08-30 ENCOUNTER — HOSPITAL ENCOUNTER (EMERGENCY)
Facility: HOSPITAL | Age: 62
Discharge: HOME | End: 2022-08-30
Attending: EMERGENCY MEDICINE
Payer: COMMERCIAL

## 2022-08-30 VITALS
RESPIRATION RATE: 16 BRPM | OXYGEN SATURATION: 97 % | HEART RATE: 66 BPM | DIASTOLIC BLOOD PRESSURE: 74 MMHG | SYSTOLIC BLOOD PRESSURE: 127 MMHG | TEMPERATURE: 98 F

## 2022-08-30 DIAGNOSIS — S42.201A CLOSED FRACTURE OF PROXIMAL END OF RIGHT HUMERUS, UNSPECIFIED FRACTURE MORPHOLOGY, INITIAL ENCOUNTER: Primary | ICD-10-CM

## 2022-08-30 DIAGNOSIS — S49.91XA SHOULDER INJURY, RIGHT, INITIAL ENCOUNTER: ICD-10-CM

## 2022-08-30 PROCEDURE — 23600 PR CLOSED RX PROX HUMERUS FRACTURE: ICD-10-PCS | Mod: 54,RT,, | Performed by: PHYSICIAN ASSISTANT

## 2022-08-30 PROCEDURE — 99284 EMERGENCY DEPT VISIT MOD MDM: CPT | Mod: 57,,, | Performed by: PHYSICIAN ASSISTANT

## 2022-08-30 PROCEDURE — 63600175 PHARM REV CODE 636 W HCPCS: Performed by: PHYSICIAN ASSISTANT

## 2022-08-30 PROCEDURE — 99284 PR EMERGENCY DEPT VISIT,LEVEL IV: ICD-10-PCS | Mod: 57,,, | Performed by: PHYSICIAN ASSISTANT

## 2022-08-30 PROCEDURE — 23600 CLTX PROX HUMRL FX W/O MNPJ: CPT | Mod: 54,RT,, | Performed by: PHYSICIAN ASSISTANT

## 2022-08-30 PROCEDURE — 25000003 PHARM REV CODE 250: Performed by: PHYSICIAN ASSISTANT

## 2022-08-30 PROCEDURE — 96372 THER/PROPH/DIAG INJ SC/IM: CPT | Performed by: PHYSICIAN ASSISTANT

## 2022-08-30 PROCEDURE — 99284 EMERGENCY DEPT VISIT MOD MDM: CPT

## 2022-08-30 RX ORDER — OXYCODONE HYDROCHLORIDE 5 MG/1
5 TABLET ORAL EVERY 4 HOURS PRN
Qty: 18 TABLET | Refills: 0 | Status: SHIPPED | OUTPATIENT
Start: 2022-08-30 | End: 2022-09-28

## 2022-08-30 RX ORDER — OXYCODONE HYDROCHLORIDE 5 MG/1
5 TABLET ORAL
Status: COMPLETED | OUTPATIENT
Start: 2022-08-30 | End: 2022-08-30

## 2022-08-30 RX ORDER — IBUPROFEN 600 MG/1
600 TABLET ORAL EVERY 6 HOURS
Qty: 30 TABLET | Refills: 0 | Status: SHIPPED | OUTPATIENT
Start: 2022-08-30 | End: 2022-09-14 | Stop reason: SDUPTHER

## 2022-08-30 RX ORDER — MORPHINE SULFATE 4 MG/ML
4 INJECTION, SOLUTION INTRAMUSCULAR; INTRAVENOUS
Status: COMPLETED | OUTPATIENT
Start: 2022-08-30 | End: 2022-08-30

## 2022-08-30 RX ADMIN — MORPHINE SULFATE 4 MG: 4 INJECTION, SOLUTION INTRAMUSCULAR; INTRAVENOUS at 04:08

## 2022-08-30 RX ADMIN — OXYCODONE 5 MG: 5 TABLET ORAL at 02:08
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
## 2022-08-30 NOTE — DISCHARGE INSTRUCTIONS
You have a broken bone in the upper arm that is part of the shoulder joint.   Take the ibuprofen 600mg every 6 hours for pain  Bharat Tylenol(acetaminophen) 650mg every 6 hours for pain.  Take the hydrocodone as needed for breakthrough pain.  Take a stool softener when taking this medication.  It can cause constipation.   Follow up in Orthopedic surgery clinic for further management.     Return to the ER if you develop any numbness or weakness in the arm or any other worrisome symptoms.
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
## 2022-08-31 ENCOUNTER — TELEPHONE (OUTPATIENT)
Dept: ORTHOPEDICS | Facility: CLINIC | Age: 62
End: 2022-08-31
Payer: COMMERCIAL
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
## 2022-09-06 ENCOUNTER — OFFICE VISIT (OUTPATIENT)
Dept: ORTHOPEDICS | Facility: CLINIC | Age: 62
End: 2022-09-06
Payer: COMMERCIAL

## 2022-09-06 DIAGNOSIS — S42.294A OTHER CLOSED NONDISPLACED FRACTURE OF PROXIMAL END OF RIGHT HUMERUS, INITIAL ENCOUNTER: Primary | ICD-10-CM

## 2022-09-06 DIAGNOSIS — M25.561 ACUTE PAIN OF RIGHT KNEE: ICD-10-CM

## 2022-09-06 PROCEDURE — 99999 PR PBB SHADOW E&M-EST. PATIENT-LVL II: ICD-10-PCS | Mod: PBBFAC,,, | Performed by: NURSE PRACTITIONER

## 2022-09-06 PROCEDURE — 1159F PR MEDICATION LIST DOCUMENTED IN MEDICAL RECORD: ICD-10-PCS | Mod: CPTII,S$GLB,, | Performed by: NURSE PRACTITIONER

## 2022-09-06 PROCEDURE — 1160F RVW MEDS BY RX/DR IN RCRD: CPT | Mod: CPTII,S$GLB,, | Performed by: NURSE PRACTITIONER

## 2022-09-06 PROCEDURE — 1160F PR REVIEW ALL MEDS BY PRESCRIBER/CLIN PHARMACIST DOCUMENTED: ICD-10-PCS | Mod: CPTII,S$GLB,, | Performed by: NURSE PRACTITIONER

## 2022-09-06 PROCEDURE — 99203 OFFICE O/P NEW LOW 30 MIN: CPT | Mod: S$GLB,,, | Performed by: NURSE PRACTITIONER

## 2022-09-06 PROCEDURE — 1159F MED LIST DOCD IN RCRD: CPT | Mod: CPTII,S$GLB,, | Performed by: NURSE PRACTITIONER

## 2022-09-06 PROCEDURE — 99203 PR OFFICE/OUTPT VISIT, NEW, LEVL III, 30-44 MIN: ICD-10-PCS | Mod: S$GLB,,, | Performed by: NURSE PRACTITIONER

## 2022-09-06 PROCEDURE — 99999 PR PBB SHADOW E&M-EST. PATIENT-LVL II: CPT | Mod: PBBFAC,,, | Performed by: NURSE PRACTITIONER
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
## 2022-09-13 DIAGNOSIS — S42.294A OTHER CLOSED NONDISPLACED FRACTURE OF PROXIMAL END OF RIGHT HUMERUS, INITIAL ENCOUNTER: ICD-10-CM

## 2022-09-13 DIAGNOSIS — M25.561 ACUTE PAIN OF RIGHT KNEE: Primary | ICD-10-CM

## 2022-09-14 ENCOUNTER — HOSPITAL ENCOUNTER (OUTPATIENT)
Dept: RADIOLOGY | Facility: HOSPITAL | Age: 62
Discharge: HOME | End: 2022-09-14
Attending: NURSE PRACTITIONER
Payer: COMMERCIAL

## 2022-09-14 ENCOUNTER — OFFICE VISIT (OUTPATIENT)
Dept: ORTHOPEDICS | Facility: CLINIC | Age: 62
End: 2022-09-14
Payer: COMMERCIAL

## 2022-09-14 VITALS — HEIGHT: 71 IN | BODY MASS INDEX: 34.35 KG/M2 | WEIGHT: 245.38 LBS

## 2022-09-14 DIAGNOSIS — S42.294D OTHER CLOSED NONDISPLACED FRACTURE OF PROXIMAL END OF RIGHT HUMERUS WITH ROUTINE HEALING, SUBSEQUENT ENCOUNTER: Primary | ICD-10-CM

## 2022-09-14 DIAGNOSIS — M25.561 ACUTE PAIN OF RIGHT KNEE: ICD-10-CM

## 2022-09-14 DIAGNOSIS — S42.294A OTHER CLOSED NONDISPLACED FRACTURE OF PROXIMAL END OF RIGHT HUMERUS, INITIAL ENCOUNTER: ICD-10-CM

## 2022-09-14 PROCEDURE — 1159F PR MEDICATION LIST DOCUMENTED IN MEDICAL RECORD: ICD-10-PCS | Mod: CPTII,S$GLB,, | Performed by: NURSE PRACTITIONER

## 2022-09-14 PROCEDURE — 73030 XR SHOULDER COMPLETE 2 OR MORE VIEWS RIGHT: ICD-10-PCS | Mod: 26,RT,, | Performed by: RADIOLOGY

## 2022-09-14 PROCEDURE — 3008F PR BODY MASS INDEX (BMI) DOCUMENTED: ICD-10-PCS | Mod: CPTII,S$GLB,, | Performed by: NURSE PRACTITIONER

## 2022-09-14 PROCEDURE — 73030 X-RAY EXAM OF SHOULDER: CPT | Mod: 26,RT,, | Performed by: RADIOLOGY

## 2022-09-14 PROCEDURE — 3008F BODY MASS INDEX DOCD: CPT | Mod: CPTII,S$GLB,, | Performed by: NURSE PRACTITIONER

## 2022-09-14 PROCEDURE — 73562 X-RAY EXAM OF KNEE 3: CPT | Mod: TC,59,LT

## 2022-09-14 PROCEDURE — 99214 OFFICE O/P EST MOD 30 MIN: CPT | Mod: S$GLB,,, | Performed by: NURSE PRACTITIONER

## 2022-09-14 PROCEDURE — 73030 X-RAY EXAM OF SHOULDER: CPT | Mod: TC,RT

## 2022-09-14 PROCEDURE — 99214 PR OFFICE/OUTPT VISIT, EST, LEVL IV, 30-39 MIN: ICD-10-PCS | Mod: S$GLB,,, | Performed by: NURSE PRACTITIONER

## 2022-09-14 PROCEDURE — 73562 XR KNEE ORTHO RIGHT WITH FLEXION: ICD-10-PCS | Mod: 26,LT,, | Performed by: RADIOLOGY

## 2022-09-14 PROCEDURE — 73564 XR KNEE ORTHO RIGHT WITH FLEXION: ICD-10-PCS | Mod: 26,RT,, | Performed by: RADIOLOGY

## 2022-09-14 PROCEDURE — 1159F MED LIST DOCD IN RCRD: CPT | Mod: CPTII,S$GLB,, | Performed by: NURSE PRACTITIONER

## 2022-09-14 PROCEDURE — 73562 X-RAY EXAM OF KNEE 3: CPT | Mod: 26,LT,, | Performed by: RADIOLOGY

## 2022-09-14 PROCEDURE — 1160F RVW MEDS BY RX/DR IN RCRD: CPT | Mod: CPTII,S$GLB,, | Performed by: NURSE PRACTITIONER

## 2022-09-14 PROCEDURE — 1160F PR REVIEW ALL MEDS BY PRESCRIBER/CLIN PHARMACIST DOCUMENTED: ICD-10-PCS | Mod: CPTII,S$GLB,, | Performed by: NURSE PRACTITIONER

## 2022-09-14 PROCEDURE — 99999 PR PBB SHADOW E&M-EST. PATIENT-LVL III: CPT | Mod: PBBFAC,,, | Performed by: NURSE PRACTITIONER

## 2022-09-14 PROCEDURE — 99999 PR PBB SHADOW E&M-EST. PATIENT-LVL III: ICD-10-PCS | Mod: PBBFAC,,, | Performed by: NURSE PRACTITIONER

## 2022-09-14 PROCEDURE — 73564 X-RAY EXAM KNEE 4 OR MORE: CPT | Mod: 26,RT,, | Performed by: RADIOLOGY

## 2022-09-14 RX ORDER — IBUPROFEN 600 MG/1
600 TABLET ORAL EVERY 6 HOURS
Qty: 30 TABLET | Refills: 0 | Status: SHIPPED | OUTPATIENT
Start: 2022-09-14 | End: 2022-09-14 | Stop reason: SDUPTHER

## 2022-09-14 RX ORDER — IBUPROFEN 600 MG/1
600 TABLET ORAL EVERY 6 HOURS
Qty: 30 TABLET | Refills: 0 | Status: SHIPPED | OUTPATIENT
Start: 2022-09-14
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
## 2022-09-28 ENCOUNTER — OFFICE VISIT (OUTPATIENT)
Dept: ORTHOPEDICS | Facility: CLINIC | Age: 62
End: 2022-09-28
Payer: COMMERCIAL

## 2022-09-28 ENCOUNTER — HOSPITAL ENCOUNTER (OUTPATIENT)
Dept: RADIOLOGY | Facility: HOSPITAL | Age: 62
Discharge: HOME | End: 2022-09-28
Attending: NURSE PRACTITIONER
Payer: COMMERCIAL

## 2022-09-28 DIAGNOSIS — S42.294D OTHER CLOSED NONDISPLACED FRACTURE OF PROXIMAL END OF RIGHT HUMERUS WITH ROUTINE HEALING, SUBSEQUENT ENCOUNTER: Primary | ICD-10-CM

## 2022-09-28 DIAGNOSIS — M25.511 ACUTE PAIN OF RIGHT SHOULDER: ICD-10-CM

## 2022-09-28 DIAGNOSIS — M25.511 ACUTE PAIN OF RIGHT SHOULDER: Primary | ICD-10-CM

## 2022-09-28 PROCEDURE — 99213 PR OFFICE/OUTPT VISIT, EST, LEVL III, 20-29 MIN: ICD-10-PCS | Mod: S$GLB,,, | Performed by: NURSE PRACTITIONER

## 2022-09-28 PROCEDURE — 1160F PR REVIEW ALL MEDS BY PRESCRIBER/CLIN PHARMACIST DOCUMENTED: ICD-10-PCS | Mod: CPTII,S$GLB,, | Performed by: NURSE PRACTITIONER

## 2022-09-28 PROCEDURE — 73030 X-RAY EXAM OF SHOULDER: CPT | Mod: 26,RT,, | Performed by: RADIOLOGY

## 2022-09-28 PROCEDURE — 99999 PR PBB SHADOW E&M-EST. PATIENT-LVL II: ICD-10-PCS | Mod: PBBFAC,,, | Performed by: NURSE PRACTITIONER

## 2022-09-28 PROCEDURE — 73030 XR SHOULDER COMPLETE 2 OR MORE VIEWS RIGHT: ICD-10-PCS | Mod: 26,RT,, | Performed by: RADIOLOGY

## 2022-09-28 PROCEDURE — 1159F PR MEDICATION LIST DOCUMENTED IN MEDICAL RECORD: ICD-10-PCS | Mod: CPTII,S$GLB,, | Performed by: NURSE PRACTITIONER

## 2022-09-28 PROCEDURE — 1160F RVW MEDS BY RX/DR IN RCRD: CPT | Mod: CPTII,S$GLB,, | Performed by: NURSE PRACTITIONER

## 2022-09-28 PROCEDURE — 99213 OFFICE O/P EST LOW 20 MIN: CPT | Mod: S$GLB,,, | Performed by: NURSE PRACTITIONER

## 2022-09-28 PROCEDURE — 99999 PR PBB SHADOW E&M-EST. PATIENT-LVL II: CPT | Mod: PBBFAC,,, | Performed by: NURSE PRACTITIONER

## 2022-09-28 PROCEDURE — 1159F MED LIST DOCD IN RCRD: CPT | Mod: CPTII,S$GLB,, | Performed by: NURSE PRACTITIONER

## 2022-09-28 PROCEDURE — 73030 X-RAY EXAM OF SHOULDER: CPT | Mod: TC,RT
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
## 2022-10-04 ENCOUNTER — CLINICAL SUPPORT (OUTPATIENT)
Dept: REHABILITATION | Facility: HOSPITAL | Age: 62
End: 2022-10-04
Payer: COMMERCIAL

## 2022-10-04 DIAGNOSIS — S49.91XA INJURY OF RIGHT SHOULDER, INITIAL ENCOUNTER: ICD-10-CM

## 2022-10-04 DIAGNOSIS — S42.294D OTHER CLOSED NONDISPLACED FRACTURE OF PROXIMAL END OF RIGHT HUMERUS WITH ROUTINE HEALING, SUBSEQUENT ENCOUNTER: ICD-10-CM

## 2022-10-04 PROBLEM — S42.201D CLOSED FRACTURE OF PROXIMAL END OF RIGHT HUMERUS WITH ROUTINE HEALING: Status: ACTIVE | Noted: 2022-10-04

## 2022-10-04 PROCEDURE — 97140 MANUAL THERAPY 1/> REGIONS: CPT | Mod: PO

## 2022-10-04 PROCEDURE — 97161 PT EVAL LOW COMPLEX 20 MIN: CPT | Mod: PO
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
## 2022-10-10 ENCOUNTER — CLINICAL SUPPORT (OUTPATIENT)
Dept: REHABILITATION | Facility: HOSPITAL | Age: 62
End: 2022-10-10
Payer: COMMERCIAL

## 2022-10-10 DIAGNOSIS — S49.91XA INJURY OF RIGHT SHOULDER, INITIAL ENCOUNTER: ICD-10-CM

## 2022-10-10 DIAGNOSIS — S42.294D OTHER CLOSED NONDISPLACED FRACTURE OF PROXIMAL END OF RIGHT HUMERUS WITH ROUTINE HEALING, SUBSEQUENT ENCOUNTER: Primary | ICD-10-CM

## 2022-10-10 PROCEDURE — 97110 THERAPEUTIC EXERCISES: CPT | Mod: PO,CQ

## 2022-10-10 NOTE — PROGRESS NOTES
"[facility]   Physical Therapy Treatment Note     Name: Yusuf Marie  Clinic Number: [id number]    Therapy Diagnosis:   Encounter Diagnoses   Name Primary?    Other closed nondisplaced fracture of proximal end of right humerus with routine healing, subsequent encounter Yes    Injury of right shoulder, initial encounter      Physician: Yrn Nugent NP    Visit Date: 10/10/2022    Physician: Yrn Nugent NP     Physician Orders: PT Eval and Treat   Medical Diagnosis from Referral: S42.294D (ICD-10-CM) - Other closed nondisplaced fracture of proximal end of right humerus with routine healing, subsequent encounter   Evaluation Date: 10/4/2022  Authorization Period Expiration: 3/4/2023  Plan of Care Expiration: 1/4/2023  Progress Note Due: 11/4/2022  Visit # / Visits authorized: 1/20    FOTO: 1/3     Precautions: Standard and Fall     PTA Visit #: 1/5     Time In: 0830  Time Out: 0910  Total Billable Time: 40 minutes    SUBJECTIVE     Pt reports: his shoulder is doing pretty good and hasn't had any real issues.   Response to previous treatment: Initial eval.   Functional change: Ongoing    Pain: not rated /10  Location: right shoulder      OBJECTIVE     Objective Measures updated at progress report unless specified.     Treatment     Yusuf received the treatments listed below:      therapeutic exercises to develop strength, endurance, ROM, flexibility, and posture for 30 minutes including:    Scap squeeze's x 30 3"   Posterior shoulder rolls x 30  Seated upper trap stretch 3 x 30" B   Ball squeeze's 3'   Wrist flex/ext 2' ea.   Wrist RD/UD 2'ea     manual therapy techniques: 10    PROM to R shoulder     Patient Education and Home Exercises     Home Exercises Provided and Patient Education Provided     Education provided:    Written Home Exercises Provided:   ASSESSMENT     Pt with no pain post treatment session.  Pt with some discomfort with PROM stretching in flexion and " abduction to 90 degrees.  Will continue to monitor and progress pt within his tolerance.     Yusuf Is progressing well towards his goals.   Pt prognosis is Good.     Pt will continue to benefit from skilled outpatient physical therapy to address the deficits listed in the problem list box on initial evaluation, provide pt/family education and to maximize pt's level of independence in the home and community environment.     Pt's spiritual, cultural and educational needs considered and pt agreeable to plan of care and goals.     Anticipated barriers to physical therapy: R UE dominant    Goals:     Short Term Goals: 4 weeks   Patient will be able to perform AROM to 90 degrees in flex and abd without pain  2. Assess MMT when applicable and set goals accordingly  3. Patient will show 50% reduction in muscle tone around the right shoulder complex  4. Patient will comply with home exercise program at all times     Long Term Goals: 8 weeks   Patient will be able to don jackets without right shoulder pain  2. Patient will be able to reach overhead to store dishes and groceries without pain increasing  3. Patient will be able to safely carry > 15# with RUE without pain increasing  4. 50% FOTO score improvement    PLAN     Cont. PT POC.     Narciso Lord, PTA

## 2022-10-14 ENCOUNTER — CLINICAL SUPPORT (OUTPATIENT)
Dept: REHABILITATION | Facility: HOSPITAL | Age: 62
End: 2022-10-14
Payer: COMMERCIAL

## 2022-10-14 DIAGNOSIS — S49.91XA INJURY OF RIGHT SHOULDER, INITIAL ENCOUNTER: Primary | ICD-10-CM

## 2022-10-14 PROCEDURE — 97110 THERAPEUTIC EXERCISES: CPT | Mod: PO,CQ

## 2022-10-14 NOTE — PROGRESS NOTES
"[facility]   Physical Therapy Treatment Note     Name: Yusuf Marie  Clinic Number: [id number]    Therapy Diagnosis:   Encounter Diagnosis   Name Primary?    Injury of right shoulder, initial encounter Yes     Physician: Yrn Nugent NP    Visit Date: 10/14/2022    Physician: Yrn Nugent NP     Physician Orders: PT Eval and Treat   Medical Diagnosis from Referral: S42.294D (ICD-10-CM) - Other closed nondisplaced fracture of proximal end of right humerus with routine healing, subsequent encounter   Evaluation Date: 10/4/2022  Authorization Period Expiration: 3/4/2023  Plan of Care Expiration: 1/4/2023  Progress Note Due: 11/4/2022  Visit # / Visits authorized: 2/20    FOTO: 1/3     Precautions: Standard and Fall     PTA Visit #: 2/5     Time In: 0815  Time Out: 9:00  Total Billable Time: 45 minutes    SUBJECTIVE     Pt reports: he felt pretty good after his last treatment session.  Response to previous treatment: Mild soreness   Functional change: Ongoing    Pain: not rated /10  Location: right shoulder      OBJECTIVE     Start: after MD appt on 9/26  -PROM for 2 weeks then,  -AAROM for 2 weeks then,  -AROM for 2 weeks  Objective Measures updated at progress report unless specified.     Treatment     Yusuf received the treatments listed below:      therapeutic exercises to develop strength, endurance, ROM, flexibility, and posture for 37 minutes including:    Scap squeeze's x 30 3"   AAROM shoulder flexion with dowel 2'  AAROM shoulder abduction with dowel 2'   AAROM ER with dowel at neutral 2'  Posterior shoulder rolls x 30  Seated upper trap stretch 3 x 30" B   Ball squeeze's 3'   Wrist flex/ext 2' ea.   Wrist RD/UD 2'ea     manual therapy techniques: 8    PROM to R shoulder     Patient Education and Home Exercises     Home Exercises Provided and Patient Education Provided     Education provided:    Written Home Exercises Provided:   ASSESSMENT      Active assisted " range of motion exercises were added to pt's therapeutic exercise regimen this morning per MD protocol.  Pt with some minor discomfort during the exercises but noted feeling better at the end of treatment session.  Will continue to monitor and progress pt within his tolerance.     Yusuf Is progressing well towards his goals.   Pt prognosis is Good.     Pt will continue to benefit from skilled outpatient physical therapy to address the deficits listed in the problem list box on initial evaluation, provide pt/family education and to maximize pt's level of independence in the home and community environment.     Pt's spiritual, cultural and educational needs considered and pt agreeable to plan of care and goals.     Anticipated barriers to physical therapy: R UE dominant    Goals:     Short Term Goals: 4 weeks   Patient will be able to perform AROM to 90 degrees in flex and abd without pain  2. Assess MMT when applicable and set goals accordingly  3. Patient will show 50% reduction in muscle tone around the right shoulder complex  4. Patient will comply with home exercise program at all times     Long Term Goals: 8 weeks   Patient will be able to don jackets without right shoulder pain  2. Patient will be able to reach overhead to store dishes and groceries without pain increasing  3. Patient will be able to safely carry > 15# with RUE without pain increasing  4. 50% FOTO score improvement    PLAN     Cont. PT POC.     Narciso Lord, PTA

## 2022-10-18 ENCOUNTER — CLINICAL SUPPORT (OUTPATIENT)
Dept: REHABILITATION | Facility: HOSPITAL | Age: 62
End: 2022-10-18
Payer: COMMERCIAL

## 2022-10-18 DIAGNOSIS — M25.611 IMPAIRED RANGE OF MOTION OF RIGHT SHOULDER: ICD-10-CM

## 2022-10-18 DIAGNOSIS — R68.89 IMPAIRED FUNCTION OF UPPER EXTREMITY: ICD-10-CM

## 2022-10-18 PROCEDURE — 97110 THERAPEUTIC EXERCISES: CPT | Mod: PO | Performed by: PHYSICAL THERAPIST

## 2022-10-18 PROCEDURE — 97140 MANUAL THERAPY 1/> REGIONS: CPT | Mod: PO | Performed by: PHYSICAL THERAPIST

## 2022-10-18 NOTE — PROGRESS NOTES
"[facility]   Physical Therapy Treatment Note     Name: Yusuf Marie  Clinic Number: [id number]    Therapy Diagnosis:   Encounter Diagnoses   Name Primary?    Impaired range of motion of right shoulder     Impaired function of upper extremity        Physician: Yrn Nugent NP    Visit Date: 10/18/2022    Physician: Yrn Nugent NP     Physician Orders: PT Eval and Treat   Medical Diagnosis from Referral: S42.294D (ICD-10-CM) - Other closed nondisplaced fracture of proximal end of right humerus with routine healing, subsequent encounter   Evaluation Date: 10/4/2022  Authorization Period Expiration: 3/4/2023  Plan of Care Expiration: 1/4/2023  Progress Note Due: 11/4/2022  Visit # / Visits authorized: 3/20    FOTO: 1/3     Precautions: Standard and Fall     PTA Visit #: 2/5     Time In: 1055  Time Out: 1140  Total Billable Time: 45 minutes    SUBJECTIVE     Pt reports: the arm is feeling good at the moment. Discomfort sleeping.   Doing the exercises at home   Response to previous treatment: felt good. Some pain later and just weak.   Functional change: Ongoing    Pain: 0 /10  Location: right shoulder      OBJECTIVE     Start: after MD appt on 9/26  -PROM for 2 weeks then,  -AAROM for 2 weeks then,  -AROM for 2 weeks  Objective Measures updated at progress report unless specified.     Treatment     Yusuf received the treatments listed below:      therapeutic exercises to develop strength, endurance, ROM, flexibility, and posture for 35 minutes including:    Scap squeeze's x 30 3"   AAROM shoulder flexion with dowel 2'  AAROM shoulder hor abduction with dowel 2'   AAROM ER with dowel at neutral 2'  Hor add  /  abd gurmeet with pilates ring at 90 and at 0 hold 5" x 6 ea   Posterior shoulder rolls x 30 seated   Seated upper trap stretch 9 @ 10 " R- only     Ball squeeze's 3'   Wrist flex/ext 2' ea.   Wrist RD/UD 2'ea     manual therapy techniques: 1-1 PT  = 8 min   -GH inferior " and posterior mobs grades II - III  -passive OH stretching   -ER / IR mobs with the shoulder at 90 and at 45 degree positions     PROM to R shoulder     Patient Education and Home Exercises     Home Exercises Provided and Patient Education Provided     Education provided: reviewed sleep position with pillow under the right posterior shoulder / scapula and distal arm in supine and also sidelying.     Written Home Exercises Provided:   ASSESSMENT     The patient performed the activities noted. The response to isometrics with the pilates ring was good and also to mobs especially posterior glides in the OH position. He performed with AA activities without difficulty.      Yusuf Is progressing well towards his goals.   Pt prognosis is Good.     Pt will continue to benefit from skilled outpatient physical therapy to address the deficits listed in the problem list box on initial evaluation, provide pt/family education and to maximize pt's level of independence in the home and community environment.     Pt's spiritual, cultural and educational needs considered and pt agreeable to plan of care and goals.     Anticipated barriers to physical therapy: R UE dominant    Goals:     Short Term Goals: 4 weeks   Patient will be able to perform AROM to 90 degrees in flex and abd without pain  2. Assess MMT when applicable and set goals accordingly  3. Patient will show 50% reduction in muscle tone around the right shoulder complex  4. Patient will comply with home exercise program at all times     Long Term Goals: 8 weeks   Patient will be able to don jackets without right shoulder pain  2. Patient will be able to reach overhead to store dishes and groceries without pain increasing  3. Patient will be able to safely carry > 15# with RUE without pain increasing  4. 50% FOTO score improvement    PLAN     Cont. PT POC.     Venkat Lehman, PT

## 2022-10-21 ENCOUNTER — CLINICAL SUPPORT (OUTPATIENT)
Dept: REHABILITATION | Facility: HOSPITAL | Age: 62
End: 2022-10-21
Attending: NURSE PRACTITIONER
Payer: COMMERCIAL

## 2022-10-21 DIAGNOSIS — M25.611 IMPAIRED RANGE OF MOTION OF RIGHT SHOULDER: Primary | ICD-10-CM

## 2022-10-21 DIAGNOSIS — S49.91XA INJURY OF RIGHT SHOULDER, INITIAL ENCOUNTER: ICD-10-CM

## 2022-10-21 DIAGNOSIS — R68.89 IMPAIRED FUNCTION OF UPPER EXTREMITY: ICD-10-CM

## 2022-10-21 PROCEDURE — 97140 MANUAL THERAPY 1/> REGIONS: CPT | Mod: PO

## 2022-10-21 PROCEDURE — 97110 THERAPEUTIC EXERCISES: CPT | Mod: PO

## 2022-10-21 NOTE — PROGRESS NOTES
"[facility]   Physical Therapy Treatment Note     Name: Yusuf Marie  Clinic Number: [id number]    Therapy Diagnosis:   Encounter Diagnoses   Name Primary?    Impaired range of motion of right shoulder Yes    Injury of right shoulder, initial encounter     Impaired function of upper extremity        Physician: Yrn Nugent NP    Visit Date: 10/21/2022    Physician: Yrn Nugent NP     Physician Orders: PT Eval and Treat   Medical Diagnosis from Referral: S42.294D (ICD-10-CM) - Other closed nondisplaced fracture of proximal end of right humerus with routine healing, subsequent encounter   Evaluation Date: 10/4/2022  Authorization Period Expiration: 3/4/2023  Plan of Care Expiration: 1/4/2023  Progress Note Due: 11/4/2022  Visit # / Visits authorized: 4/20    FOTO: 1/3     Precautions: Standard and Fall     PTA Visit #: 0/5     Time In: 830 AM  Time Out: 910 AM  Total Billable Time: 40 minutes    SUBJECTIVE     Pt reports: woke this morning with some shoulder pain - just a few moments of discomfort and then the pain left the shoulder, happened around 10 times this morning.   Doing the exercises at home   Response to previous treatment: felt good. Some pain later and just weak.   Functional change: Ongoing    Pain: 6-7 /10 - only in the moment, returned to 0/10 after  Location: right shoulder      OBJECTIVE     Start: after MD appt on 9/26  -PROM for 2 weeks then,  -AAROM for 2 weeks then,  -AROM for 2 weeks  Objective Measures updated at progress report unless specified.     Treatment     Yusuf received the treatments listed below:      therapeutic exercises to develop strength, endurance, ROM, flexibility, and posture for 35 minutes including:    Scap squeeze's x 30 3"   AAROM shoulder flexion with dowel 2'  AAROM shoulder hor abduction with dowel 2'   AAROM ER with dowel at neutral 2'  Hor add  /  abd gurmeet with pilates ring at 90 and at 0 hold 5" x 6 ea   External " "rotation/internal rotation iso with pilates ring X20 with 3 second hold  Posterior shoulder rolls x 30 seated   Seated upper trap stretch 9 @ 10 " R- only     Ball squeeze's 3'   Wrist flex/ext 2' ea.   Wrist RD/UD 2'ea     manual therapy techniques: 1-1 PT  = 8 min   -GH inferior and posterior mobs grades II - III  -passive OH stretching   -ER / IR mobs with the shoulder at 90 and at 45 degree positions     PROM to R shoulder     Patient Education and Home Exercises     Home Exercises Provided and Patient Education Provided     Education provided: reviewed sleep position with pillow under the right posterior shoulder / scapula and distal arm in supine and also sidelying.     Written Home Exercises Provided:   ASSESSMENT     The patient performed the activities noted with normal soreness as we enter greater PROM ranges. He will be ready to enter week 5 protocol next week for AROM activity.      Yusuf Is progressing well towards his goals.   Pt prognosis is Good.     Pt will continue to benefit from skilled outpatient physical therapy to address the deficits listed in the problem list box on initial evaluation, provide pt/family education and to maximize pt's level of independence in the home and community environment.     Pt's spiritual, cultural and educational needs considered and pt agreeable to plan of care and goals.     Anticipated barriers to physical therapy: R UE dominant    Goals:     Short Term Goals: 4 weeks   Patient will be able to perform AROM to 90 degrees in flex and abd without pain  2. Assess MMT when applicable and set goals accordingly  3. Patient will show 50% reduction in muscle tone around the right shoulder complex  4. Patient will comply with home exercise program at all times     Long Term Goals: 8 weeks   Patient will be able to don jackets without right shoulder pain  2. Patient will be able to reach overhead to store dishes and groceries without pain increasing  3. Patient will be " able to safely carry > 15# with RUE without pain increasing  4. 50% FOTO score improvement    PLAN     Cont. PT POC.     Kasandra Sewell, PT

## 2022-10-26 ENCOUNTER — OFFICE VISIT (OUTPATIENT)
Dept: ORTHOPEDICS | Facility: CLINIC | Age: 62
End: 2022-10-26
Payer: COMMERCIAL

## 2022-10-26 ENCOUNTER — HOSPITAL ENCOUNTER (OUTPATIENT)
Dept: RADIOLOGY | Facility: HOSPITAL | Age: 62
Discharge: HOME | End: 2022-10-26
Attending: NURSE PRACTITIONER
Payer: COMMERCIAL

## 2022-10-26 VITALS — BODY MASS INDEX: 34.35 KG/M2 | HEIGHT: 71 IN | WEIGHT: 245.38 LBS

## 2022-10-26 DIAGNOSIS — M25.511 ACUTE PAIN OF RIGHT SHOULDER: ICD-10-CM

## 2022-10-26 DIAGNOSIS — M25.511 ACUTE PAIN OF RIGHT SHOULDER: Primary | ICD-10-CM

## 2022-10-26 DIAGNOSIS — S42.294D OTHER CLOSED NONDISPLACED FRACTURE OF PROXIMAL END OF RIGHT HUMERUS WITH ROUTINE HEALING, SUBSEQUENT ENCOUNTER: Primary | ICD-10-CM

## 2022-10-26 PROCEDURE — 1159F PR MEDICATION LIST DOCUMENTED IN MEDICAL RECORD: ICD-10-PCS | Mod: CPTII,S$GLB,, | Performed by: NURSE PRACTITIONER

## 2022-10-26 PROCEDURE — 1160F PR REVIEW ALL MEDS BY PRESCRIBER/CLIN PHARMACIST DOCUMENTED: ICD-10-PCS | Mod: CPTII,S$GLB,, | Performed by: NURSE PRACTITIONER

## 2022-10-26 PROCEDURE — 99213 OFFICE O/P EST LOW 20 MIN: CPT | Mod: S$GLB,,, | Performed by: NURSE PRACTITIONER

## 2022-10-26 PROCEDURE — 1159F MED LIST DOCD IN RCRD: CPT | Mod: CPTII,S$GLB,, | Performed by: NURSE PRACTITIONER

## 2022-10-26 PROCEDURE — 99999 PR PBB SHADOW E&M-EST. PATIENT-LVL III: CPT | Mod: PBBFAC,,, | Performed by: NURSE PRACTITIONER

## 2022-10-26 PROCEDURE — 73030 X-RAY EXAM OF SHOULDER: CPT | Mod: 26,RT,, | Performed by: RADIOLOGY

## 2022-10-26 PROCEDURE — 73030 X-RAY EXAM OF SHOULDER: CPT | Mod: TC,RT

## 2022-10-26 PROCEDURE — 99999 PR PBB SHADOW E&M-EST. PATIENT-LVL III: ICD-10-PCS | Mod: PBBFAC,,, | Performed by: NURSE PRACTITIONER

## 2022-10-26 PROCEDURE — 1160F RVW MEDS BY RX/DR IN RCRD: CPT | Mod: CPTII,S$GLB,, | Performed by: NURSE PRACTITIONER

## 2022-10-26 PROCEDURE — 73030 XR SHOULDER COMPLETE 2 OR MORE VIEWS RIGHT: ICD-10-PCS | Mod: 26,RT,, | Performed by: RADIOLOGY

## 2022-10-26 PROCEDURE — 99213 PR OFFICE/OUTPT VISIT, EST, LEVL III, 20-29 MIN: ICD-10-PCS | Mod: S$GLB,,, | Performed by: NURSE PRACTITIONER
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
## 2022-10-28 ENCOUNTER — CLINICAL SUPPORT (OUTPATIENT)
Dept: REHABILITATION | Facility: HOSPITAL | Age: 62
End: 2022-10-28
Attending: NURSE PRACTITIONER
Payer: COMMERCIAL

## 2022-10-28 DIAGNOSIS — M25.611 IMPAIRED RANGE OF MOTION OF RIGHT SHOULDER: Primary | ICD-10-CM

## 2022-10-28 DIAGNOSIS — R68.89 IMPAIRED FUNCTION OF UPPER EXTREMITY: ICD-10-CM

## 2022-10-28 DIAGNOSIS — S49.91XA INJURY OF RIGHT SHOULDER, INITIAL ENCOUNTER: ICD-10-CM

## 2022-10-28 PROCEDURE — 97140 MANUAL THERAPY 1/> REGIONS: CPT | Mod: PO

## 2022-10-28 PROCEDURE — 97110 THERAPEUTIC EXERCISES: CPT | Mod: PO

## 2022-10-28 NOTE — ED NOTES
Pt unable to tolerate sling placement at this time, requests to try placing sling at a later time, will continue to monitor    containing salt substitutes  Limit/avoid high phosphorus foods  Limit daily protein intake to 0.8-1 gm/kg of your ideal body weight.    We discussed lifestyle modifications:  Make sure you are drinking plenty of fluids--64 ounces (1/2 gallon) daily  Exercise at least 30 minutes 5 x per week (total 150 minutes per week), example brisk walking  Achieve and maintain a healthy weight (BMI 20-25)  Limit alcohol consumption to <2 drinks per day  Stop smoking  Make sure you stay current on important vaccines-- pneumonia vaccines (Pneumovax and Prevnar), flu vaccine, Hepatitis B (especially patients nearing renal replacement therapy and planning hemodialysis) and Covid-19 vaccine.     Recommendations:  Monitor your BP at home daily and record.  Bring readings to your next appt.  Call the office if your BP is persistently >130/80.  Seek urgent medical attention with signs and symptoms of uremia - extreme weakness, fatigue, confusion, anorexia, metallic taste in mouth, hiccoughs, cramping, itching, chest pain, swelling, or trouble sleeping.
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
## 2022-11-03 ENCOUNTER — CLINICAL SUPPORT (OUTPATIENT)
Dept: REHABILITATION | Facility: HOSPITAL | Age: 62
End: 2022-11-03
Payer: COMMERCIAL

## 2022-11-03 DIAGNOSIS — R68.89 IMPAIRED FUNCTION OF UPPER EXTREMITY: ICD-10-CM

## 2022-11-03 DIAGNOSIS — S49.91XA INJURY OF RIGHT SHOULDER, INITIAL ENCOUNTER: ICD-10-CM

## 2022-11-03 DIAGNOSIS — M25.611 IMPAIRED RANGE OF MOTION OF RIGHT SHOULDER: Primary | ICD-10-CM

## 2022-11-03 PROCEDURE — 97140 MANUAL THERAPY 1/> REGIONS: CPT | Mod: PO,CQ

## 2022-11-03 PROCEDURE — 97110 THERAPEUTIC EXERCISES: CPT | Mod: PO,CQ
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
## 2022-11-08 ENCOUNTER — CLINICAL SUPPORT (OUTPATIENT)
Dept: REHABILITATION | Facility: HOSPITAL | Age: 62
End: 2022-11-08
Payer: COMMERCIAL

## 2022-11-08 DIAGNOSIS — M25.611 IMPAIRED RANGE OF MOTION OF RIGHT SHOULDER: Primary | ICD-10-CM

## 2022-11-08 DIAGNOSIS — R68.89 IMPAIRED FUNCTION OF UPPER EXTREMITY: ICD-10-CM

## 2022-11-08 DIAGNOSIS — S49.91XA INJURY OF RIGHT SHOULDER, INITIAL ENCOUNTER: ICD-10-CM

## 2022-11-08 PROCEDURE — 97110 THERAPEUTIC EXERCISES: CPT | Mod: PO

## 2022-11-08 PROCEDURE — 97140 MANUAL THERAPY 1/> REGIONS: CPT | Mod: PO
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
## 2022-11-10 ENCOUNTER — CLINICAL SUPPORT (OUTPATIENT)
Dept: REHABILITATION | Facility: HOSPITAL | Age: 62
End: 2022-11-10
Payer: COMMERCIAL

## 2022-11-10 DIAGNOSIS — S49.91XA INJURY OF RIGHT SHOULDER, INITIAL ENCOUNTER: ICD-10-CM

## 2022-11-10 DIAGNOSIS — R68.89 IMPAIRED FUNCTION OF UPPER EXTREMITY: ICD-10-CM

## 2022-11-10 DIAGNOSIS — M25.611 IMPAIRED RANGE OF MOTION OF RIGHT SHOULDER: Primary | ICD-10-CM

## 2022-11-10 PROCEDURE — 97140 MANUAL THERAPY 1/> REGIONS: CPT | Mod: PO

## 2022-11-10 PROCEDURE — 97110 THERAPEUTIC EXERCISES: CPT | Mod: PO
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
## 2022-11-15 ENCOUNTER — CLINICAL SUPPORT (OUTPATIENT)
Dept: REHABILITATION | Facility: HOSPITAL | Age: 62
End: 2022-11-15
Payer: COMMERCIAL

## 2022-11-15 DIAGNOSIS — M25.611 IMPAIRED RANGE OF MOTION OF RIGHT SHOULDER: Primary | ICD-10-CM

## 2022-11-15 DIAGNOSIS — R68.89 IMPAIRED FUNCTION OF UPPER EXTREMITY: ICD-10-CM

## 2022-11-15 DIAGNOSIS — S49.91XA INJURY OF RIGHT SHOULDER, INITIAL ENCOUNTER: ICD-10-CM

## 2022-11-15 PROCEDURE — 97140 MANUAL THERAPY 1/> REGIONS: CPT | Mod: PO,CQ

## 2022-11-15 PROCEDURE — 97110 THERAPEUTIC EXERCISES: CPT | Mod: PO,CQ
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
## 2022-11-18 ENCOUNTER — CLINICAL SUPPORT (OUTPATIENT)
Dept: REHABILITATION | Facility: HOSPITAL | Age: 62
End: 2022-11-18
Payer: COMMERCIAL

## 2022-11-18 DIAGNOSIS — R68.89 IMPAIRED FUNCTION OF UPPER EXTREMITY: ICD-10-CM

## 2022-11-18 DIAGNOSIS — M25.611 IMPAIRED RANGE OF MOTION OF RIGHT SHOULDER: Primary | ICD-10-CM

## 2022-11-18 PROCEDURE — 97140 MANUAL THERAPY 1/> REGIONS: CPT | Mod: PO | Performed by: PHYSICAL THERAPIST

## 2022-11-18 PROCEDURE — 97110 THERAPEUTIC EXERCISES: CPT | Mod: PO | Performed by: PHYSICAL THERAPIST
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
## 2022-11-23 ENCOUNTER — TELEPHONE (OUTPATIENT)
Dept: ORTHOPEDICS | Facility: CLINIC | Age: 62
End: 2022-11-23
Payer: COMMERCIAL

## 2022-11-23 NOTE — TELEPHONE ENCOUNTER
Called and notified pt his Yrn will not be in on 12/09 and his appt has been scheduled.rescheduled pt's appt with Yrn on 12/13/2022 @ 4 pm. Pt verbally understood and was satisfied with new appt date/time.

## 2022-11-23 NOTE — TELEPHONE ENCOUNTER
Called and LVM informing pt his appt need to be rescheduled due to Yrn's absence on 12/09. Ask pt to contact the office to rescheduled.

## 2022-11-25 ENCOUNTER — CLINICAL SUPPORT (OUTPATIENT)
Dept: REHABILITATION | Facility: HOSPITAL | Age: 62
End: 2022-11-25
Payer: COMMERCIAL

## 2022-11-25 DIAGNOSIS — M25.611 IMPAIRED RANGE OF MOTION OF RIGHT SHOULDER: Primary | ICD-10-CM

## 2022-11-25 DIAGNOSIS — R68.89 IMPAIRED FUNCTION OF UPPER EXTREMITY: ICD-10-CM

## 2022-11-25 PROCEDURE — 97140 MANUAL THERAPY 1/> REGIONS: CPT | Mod: PO | Performed by: PHYSICAL THERAPIST

## 2022-11-25 PROCEDURE — 97110 THERAPEUTIC EXERCISES: CPT | Mod: PO | Performed by: PHYSICAL THERAPIST
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
## 2022-12-01 ENCOUNTER — CLINICAL SUPPORT (OUTPATIENT)
Dept: REHABILITATION | Facility: HOSPITAL | Age: 62
End: 2022-12-01
Payer: COMMERCIAL

## 2022-12-01 DIAGNOSIS — S49.91XA INJURY OF RIGHT SHOULDER, INITIAL ENCOUNTER: ICD-10-CM

## 2022-12-01 DIAGNOSIS — R68.89 IMPAIRED FUNCTION OF UPPER EXTREMITY: ICD-10-CM

## 2022-12-01 DIAGNOSIS — M25.611 IMPAIRED RANGE OF MOTION OF RIGHT SHOULDER: Primary | ICD-10-CM

## 2022-12-01 PROCEDURE — 97110 THERAPEUTIC EXERCISES: CPT | Mod: PO,CQ
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
## 2022-12-13 ENCOUNTER — HOSPITAL ENCOUNTER (OUTPATIENT)
Dept: RADIOLOGY | Facility: HOSPITAL | Age: 62
Discharge: HOME | End: 2022-12-13
Attending: NURSE PRACTITIONER
Payer: COMMERCIAL

## 2022-12-13 ENCOUNTER — OFFICE VISIT (OUTPATIENT)
Dept: ORTHOPEDICS | Facility: CLINIC | Age: 62
End: 2022-12-13
Payer: COMMERCIAL

## 2022-12-13 VITALS — WEIGHT: 245.38 LBS | HEIGHT: 71 IN | BODY MASS INDEX: 34.35 KG/M2

## 2022-12-13 DIAGNOSIS — S42.294D OTHER CLOSED NONDISPLACED FRACTURE OF PROXIMAL END OF RIGHT HUMERUS WITH ROUTINE HEALING, SUBSEQUENT ENCOUNTER: Primary | ICD-10-CM

## 2022-12-13 DIAGNOSIS — M25.511 ACUTE PAIN OF RIGHT SHOULDER: ICD-10-CM

## 2022-12-13 DIAGNOSIS — M25.511 ACUTE PAIN OF RIGHT SHOULDER: Primary | ICD-10-CM

## 2022-12-13 PROCEDURE — 1160F RVW MEDS BY RX/DR IN RCRD: CPT | Mod: CPTII,S$GLB,, | Performed by: NURSE PRACTITIONER

## 2022-12-13 PROCEDURE — 99999 PR PBB SHADOW E&M-EST. PATIENT-LVL III: ICD-10-PCS | Mod: PBBFAC,,, | Performed by: NURSE PRACTITIONER

## 2022-12-13 PROCEDURE — 73030 X-RAY EXAM OF SHOULDER: CPT | Mod: TC,RT

## 2022-12-13 PROCEDURE — 3008F PR BODY MASS INDEX (BMI) DOCUMENTED: ICD-10-PCS | Mod: CPTII,S$GLB,, | Performed by: NURSE PRACTITIONER

## 2022-12-13 PROCEDURE — 1159F PR MEDICATION LIST DOCUMENTED IN MEDICAL RECORD: ICD-10-PCS | Mod: CPTII,S$GLB,, | Performed by: NURSE PRACTITIONER

## 2022-12-13 PROCEDURE — 73030 X-RAY EXAM OF SHOULDER: CPT | Mod: 26,RT,, | Performed by: RADIOLOGY

## 2022-12-13 PROCEDURE — 1160F PR REVIEW ALL MEDS BY PRESCRIBER/CLIN PHARMACIST DOCUMENTED: ICD-10-PCS | Mod: CPTII,S$GLB,, | Performed by: NURSE PRACTITIONER

## 2022-12-13 PROCEDURE — 99213 PR OFFICE/OUTPT VISIT, EST, LEVL III, 20-29 MIN: ICD-10-PCS | Mod: S$GLB,,, | Performed by: NURSE PRACTITIONER

## 2022-12-13 PROCEDURE — 3008F BODY MASS INDEX DOCD: CPT | Mod: CPTII,S$GLB,, | Performed by: NURSE PRACTITIONER

## 2022-12-13 PROCEDURE — 1159F MED LIST DOCD IN RCRD: CPT | Mod: CPTII,S$GLB,, | Performed by: NURSE PRACTITIONER

## 2022-12-13 PROCEDURE — 73030 XR SHOULDER COMPLETE 2 OR MORE VIEWS RIGHT: ICD-10-PCS | Mod: 26,RT,, | Performed by: RADIOLOGY

## 2022-12-13 PROCEDURE — 99213 OFFICE O/P EST LOW 20 MIN: CPT | Mod: S$GLB,,, | Performed by: NURSE PRACTITIONER

## 2022-12-13 PROCEDURE — 99999 PR PBB SHADOW E&M-EST. PATIENT-LVL III: CPT | Mod: PBBFAC,,, | Performed by: NURSE PRACTITIONER
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
## 2023-01-04 ENCOUNTER — CLINICAL SUPPORT (OUTPATIENT)
Dept: REHABILITATION | Facility: HOSPITAL | Age: 63
End: 2023-01-04
Payer: COMMERCIAL

## 2023-01-04 DIAGNOSIS — R68.89 IMPAIRED FUNCTION OF UPPER EXTREMITY: ICD-10-CM

## 2023-01-04 DIAGNOSIS — M25.611 IMPAIRED RANGE OF MOTION OF RIGHT SHOULDER: Primary | ICD-10-CM

## 2023-01-04 DIAGNOSIS — S42.294D OTHER CLOSED NONDISPLACED FRACTURE OF PROXIMAL END OF RIGHT HUMERUS WITH ROUTINE HEALING, SUBSEQUENT ENCOUNTER: ICD-10-CM

## 2023-01-04 PROCEDURE — 97140 MANUAL THERAPY 1/> REGIONS: CPT | Mod: PO

## 2023-01-04 PROCEDURE — 97110 THERAPEUTIC EXERCISES: CPT | Mod: PO
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
## 2023-01-06 ENCOUNTER — CLINICAL SUPPORT (OUTPATIENT)
Dept: REHABILITATION | Facility: HOSPITAL | Age: 63
End: 2023-01-06
Payer: COMMERCIAL

## 2023-01-06 DIAGNOSIS — S49.91XA INJURY OF RIGHT SHOULDER, INITIAL ENCOUNTER: ICD-10-CM

## 2023-01-06 DIAGNOSIS — M25.611 IMPAIRED RANGE OF MOTION OF RIGHT SHOULDER: Primary | ICD-10-CM

## 2023-01-06 DIAGNOSIS — R68.89 IMPAIRED FUNCTION OF UPPER EXTREMITY: ICD-10-CM

## 2023-01-06 PROCEDURE — 97110 THERAPEUTIC EXERCISES: CPT | Mod: PO,CQ
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
## 2023-01-13 ENCOUNTER — CLINICAL SUPPORT (OUTPATIENT)
Dept: REHABILITATION | Facility: HOSPITAL | Age: 63
End: 2023-01-13
Attending: NURSE PRACTITIONER
Payer: COMMERCIAL

## 2023-01-13 DIAGNOSIS — M25.611 IMPAIRED RANGE OF MOTION OF RIGHT SHOULDER: Primary | ICD-10-CM

## 2023-01-13 DIAGNOSIS — R68.89 IMPAIRED FUNCTION OF UPPER EXTREMITY: ICD-10-CM

## 2023-01-13 DIAGNOSIS — S49.91XA INJURY OF RIGHT SHOULDER, INITIAL ENCOUNTER: ICD-10-CM

## 2023-01-13 PROCEDURE — 97110 THERAPEUTIC EXERCISES: CPT | Mod: PO,CQ
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
## 2023-01-24 ENCOUNTER — CLINICAL SUPPORT (OUTPATIENT)
Dept: REHABILITATION | Facility: HOSPITAL | Age: 63
End: 2023-01-24
Payer: COMMERCIAL

## 2023-01-24 DIAGNOSIS — M25.611 IMPAIRED RANGE OF MOTION OF RIGHT SHOULDER: Primary | ICD-10-CM

## 2023-01-24 DIAGNOSIS — S49.91XA INJURY OF RIGHT SHOULDER, INITIAL ENCOUNTER: ICD-10-CM

## 2023-01-24 DIAGNOSIS — R68.89 IMPAIRED FUNCTION OF UPPER EXTREMITY: ICD-10-CM

## 2023-01-24 PROCEDURE — 97110 THERAPEUTIC EXERCISES: CPT | Mod: PO,CQ

## 2023-01-24 PROCEDURE — 97140 MANUAL THERAPY 1/> REGIONS: CPT | Mod: PO,CQ
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
## 2023-01-26 ENCOUNTER — CLINICAL SUPPORT (OUTPATIENT)
Dept: REHABILITATION | Facility: HOSPITAL | Age: 63
End: 2023-01-26
Payer: COMMERCIAL

## 2023-01-26 DIAGNOSIS — R68.89 IMPAIRED FUNCTION OF UPPER EXTREMITY: ICD-10-CM

## 2023-01-26 DIAGNOSIS — M25.611 IMPAIRED RANGE OF MOTION OF RIGHT SHOULDER: Primary | ICD-10-CM

## 2023-01-26 DIAGNOSIS — S49.91XA INJURY OF RIGHT SHOULDER, INITIAL ENCOUNTER: ICD-10-CM

## 2023-01-26 PROCEDURE — 97110 THERAPEUTIC EXERCISES: CPT | Mod: PO,CQ

## 2023-01-26 PROCEDURE — 97140 MANUAL THERAPY 1/> REGIONS: CPT | Mod: PO,CQ
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
## 2023-01-31 ENCOUNTER — CLINICAL SUPPORT (OUTPATIENT)
Dept: REHABILITATION | Facility: HOSPITAL | Age: 63
End: 2023-01-31
Payer: COMMERCIAL

## 2023-01-31 DIAGNOSIS — M25.611 IMPAIRED RANGE OF MOTION OF RIGHT SHOULDER: Primary | ICD-10-CM

## 2023-01-31 DIAGNOSIS — R68.89 IMPAIRED FUNCTION OF UPPER EXTREMITY: ICD-10-CM

## 2023-01-31 DIAGNOSIS — S49.91XA INJURY OF RIGHT SHOULDER, INITIAL ENCOUNTER: ICD-10-CM

## 2023-01-31 PROCEDURE — 97110 THERAPEUTIC EXERCISES: CPT | Mod: PO,CQ

## 2023-01-31 PROCEDURE — 97140 MANUAL THERAPY 1/> REGIONS: CPT | Mod: PO,CQ
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
## 2023-02-02 ENCOUNTER — CLINICAL SUPPORT (OUTPATIENT)
Dept: REHABILITATION | Facility: HOSPITAL | Age: 63
End: 2023-02-02
Payer: COMMERCIAL

## 2023-02-02 DIAGNOSIS — R68.89 IMPAIRED FUNCTION OF UPPER EXTREMITY: ICD-10-CM

## 2023-02-02 DIAGNOSIS — M25.611 IMPAIRED RANGE OF MOTION OF RIGHT SHOULDER: Primary | ICD-10-CM

## 2023-02-02 PROCEDURE — 97140 MANUAL THERAPY 1/> REGIONS: CPT | Mod: PO

## 2023-02-02 PROCEDURE — 97110 THERAPEUTIC EXERCISES: CPT | Mod: PO
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
## 2023-02-06 ENCOUNTER — CLINICAL SUPPORT (OUTPATIENT)
Dept: REHABILITATION | Facility: HOSPITAL | Age: 63
End: 2023-02-06
Attending: NURSE PRACTITIONER
Payer: COMMERCIAL

## 2023-02-06 DIAGNOSIS — M25.611 IMPAIRED RANGE OF MOTION OF RIGHT SHOULDER: Primary | ICD-10-CM

## 2023-02-06 DIAGNOSIS — R68.89 IMPAIRED FUNCTION OF UPPER EXTREMITY: ICD-10-CM

## 2023-02-06 PROCEDURE — 97110 THERAPEUTIC EXERCISES: CPT | Mod: PO

## 2023-02-06 PROCEDURE — 97140 MANUAL THERAPY 1/> REGIONS: CPT | Mod: PO
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
## 2023-02-08 ENCOUNTER — CLINICAL SUPPORT (OUTPATIENT)
Dept: REHABILITATION | Facility: HOSPITAL | Age: 63
End: 2023-02-08
Payer: COMMERCIAL

## 2023-02-08 DIAGNOSIS — R68.89 IMPAIRED FUNCTION OF UPPER EXTREMITY: ICD-10-CM

## 2023-02-08 DIAGNOSIS — S49.91XA INJURY OF RIGHT SHOULDER, INITIAL ENCOUNTER: ICD-10-CM

## 2023-02-08 DIAGNOSIS — M25.611 IMPAIRED RANGE OF MOTION OF RIGHT SHOULDER: Primary | ICD-10-CM

## 2023-02-08 PROCEDURE — 97110 THERAPEUTIC EXERCISES: CPT | Mod: PO

## 2023-02-08 PROCEDURE — 97140 MANUAL THERAPY 1/> REGIONS: CPT | Mod: PO
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
## 2023-02-14 ENCOUNTER — CLINICAL SUPPORT (OUTPATIENT)
Dept: REHABILITATION | Facility: HOSPITAL | Age: 63
End: 2023-02-14
Payer: COMMERCIAL

## 2023-02-14 DIAGNOSIS — M25.611 IMPAIRED RANGE OF MOTION OF RIGHT SHOULDER: Primary | ICD-10-CM

## 2023-02-14 DIAGNOSIS — S49.91XA INJURY OF RIGHT SHOULDER, INITIAL ENCOUNTER: ICD-10-CM

## 2023-02-14 DIAGNOSIS — R68.89 IMPAIRED FUNCTION OF UPPER EXTREMITY: ICD-10-CM

## 2023-02-14 PROCEDURE — 97110 THERAPEUTIC EXERCISES: CPT | Mod: PO,CQ

## 2023-02-14 PROCEDURE — 97140 MANUAL THERAPY 1/> REGIONS: CPT | Mod: PO,CQ
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
## 2023-02-16 ENCOUNTER — CLINICAL SUPPORT (OUTPATIENT)
Dept: REHABILITATION | Facility: HOSPITAL | Age: 63
End: 2023-02-16
Attending: NURSE PRACTITIONER
Payer: COMMERCIAL

## 2023-02-16 DIAGNOSIS — R68.89 IMPAIRED FUNCTION OF UPPER EXTREMITY: ICD-10-CM

## 2023-02-16 DIAGNOSIS — M25.611 IMPAIRED RANGE OF MOTION OF RIGHT SHOULDER: Primary | ICD-10-CM

## 2023-02-16 DIAGNOSIS — S49.91XA INJURY OF RIGHT SHOULDER, INITIAL ENCOUNTER: ICD-10-CM

## 2023-02-16 PROCEDURE — 97110 THERAPEUTIC EXERCISES: CPT | Mod: PO

## 2023-02-16 PROCEDURE — 97140 MANUAL THERAPY 1/> REGIONS: CPT | Mod: PO
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
## 2023-02-20 ENCOUNTER — CLINICAL SUPPORT (OUTPATIENT)
Dept: REHABILITATION | Facility: HOSPITAL | Age: 63
End: 2023-02-20
Payer: COMMERCIAL

## 2023-02-20 DIAGNOSIS — M25.611 IMPAIRED RANGE OF MOTION OF RIGHT SHOULDER: Primary | ICD-10-CM

## 2023-02-20 DIAGNOSIS — S49.91XA INJURY OF RIGHT SHOULDER, INITIAL ENCOUNTER: ICD-10-CM

## 2023-02-20 DIAGNOSIS — R68.89 IMPAIRED FUNCTION OF UPPER EXTREMITY: ICD-10-CM

## 2023-02-20 PROCEDURE — 97110 THERAPEUTIC EXERCISES: CPT | Mod: PO

## 2023-02-20 PROCEDURE — 97140 MANUAL THERAPY 1/> REGIONS: CPT | Mod: PO
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
## 2023-02-22 ENCOUNTER — CLINICAL SUPPORT (OUTPATIENT)
Dept: REHABILITATION | Facility: HOSPITAL | Age: 63
End: 2023-02-22
Payer: COMMERCIAL

## 2023-02-22 DIAGNOSIS — M25.611 IMPAIRED RANGE OF MOTION OF RIGHT SHOULDER: Primary | ICD-10-CM

## 2023-02-22 DIAGNOSIS — S49.91XA INJURY OF RIGHT SHOULDER, INITIAL ENCOUNTER: ICD-10-CM

## 2023-02-22 DIAGNOSIS — R68.89 IMPAIRED FUNCTION OF UPPER EXTREMITY: ICD-10-CM

## 2023-02-22 PROCEDURE — 97110 THERAPEUTIC EXERCISES: CPT | Mod: PO

## 2023-02-22 PROCEDURE — 97140 MANUAL THERAPY 1/> REGIONS: CPT | Mod: PO
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
## 2023-02-27 ENCOUNTER — CLINICAL SUPPORT (OUTPATIENT)
Dept: REHABILITATION | Facility: HOSPITAL | Age: 63
End: 2023-02-27
Attending: NURSE PRACTITIONER
Payer: COMMERCIAL

## 2023-02-27 DIAGNOSIS — S49.91XA INJURY OF RIGHT SHOULDER, INITIAL ENCOUNTER: ICD-10-CM

## 2023-02-27 DIAGNOSIS — M25.611 IMPAIRED RANGE OF MOTION OF RIGHT SHOULDER: Primary | ICD-10-CM

## 2023-02-27 DIAGNOSIS — R68.89 IMPAIRED FUNCTION OF UPPER EXTREMITY: ICD-10-CM

## 2023-02-27 PROCEDURE — 97110 THERAPEUTIC EXERCISES: CPT | Mod: PO,CQ

## 2023-02-27 PROCEDURE — 97140 MANUAL THERAPY 1/> REGIONS: CPT | Mod: PO,CQ
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
## 2023-03-01 ENCOUNTER — CLINICAL SUPPORT (OUTPATIENT)
Dept: REHABILITATION | Facility: HOSPITAL | Age: 63
End: 2023-03-01
Attending: NURSE PRACTITIONER
Payer: COMMERCIAL

## 2023-03-01 DIAGNOSIS — R68.89 IMPAIRED FUNCTION OF UPPER EXTREMITY: ICD-10-CM

## 2023-03-01 DIAGNOSIS — S49.91XA INJURY OF RIGHT SHOULDER, INITIAL ENCOUNTER: ICD-10-CM

## 2023-03-01 DIAGNOSIS — M25.611 IMPAIRED RANGE OF MOTION OF RIGHT SHOULDER: Primary | ICD-10-CM

## 2023-03-01 PROCEDURE — 97530 THERAPEUTIC ACTIVITIES: CPT | Mod: PO

## 2023-03-01 PROCEDURE — 97110 THERAPEUTIC EXERCISES: CPT | Mod: PO
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
## 2023-03-07 ENCOUNTER — CLINICAL SUPPORT (OUTPATIENT)
Dept: REHABILITATION | Facility: HOSPITAL | Age: 63
End: 2023-03-07
Attending: NURSE PRACTITIONER
Payer: COMMERCIAL

## 2023-03-07 DIAGNOSIS — S49.91XA INJURY OF RIGHT SHOULDER, INITIAL ENCOUNTER: ICD-10-CM

## 2023-03-07 DIAGNOSIS — R68.89 IMPAIRED FUNCTION OF UPPER EXTREMITY: ICD-10-CM

## 2023-03-07 DIAGNOSIS — M25.611 IMPAIRED RANGE OF MOTION OF RIGHT SHOULDER: Primary | ICD-10-CM

## 2023-03-07 PROCEDURE — 97110 THERAPEUTIC EXERCISES: CPT | Mod: PO,CQ
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
## 2023-03-13 ENCOUNTER — OFFICE VISIT (OUTPATIENT)
Dept: ORTHOPEDICS | Facility: CLINIC | Age: 63
End: 2023-03-13
Payer: COMMERCIAL

## 2023-03-13 ENCOUNTER — HOSPITAL ENCOUNTER (OUTPATIENT)
Dept: RADIOLOGY | Facility: HOSPITAL | Age: 63
Discharge: HOME | End: 2023-03-13
Attending: NURSE PRACTITIONER
Payer: COMMERCIAL

## 2023-03-13 ENCOUNTER — CLINICAL SUPPORT (OUTPATIENT)
Dept: REHABILITATION | Facility: HOSPITAL | Age: 63
End: 2023-03-13
Attending: NURSE PRACTITIONER
Payer: COMMERCIAL

## 2023-03-13 DIAGNOSIS — S42.294D OTHER CLOSED NONDISPLACED FRACTURE OF PROXIMAL END OF RIGHT HUMERUS WITH ROUTINE HEALING, SUBSEQUENT ENCOUNTER: Primary | ICD-10-CM

## 2023-03-13 DIAGNOSIS — M25.511 ACUTE PAIN OF RIGHT SHOULDER: Primary | ICD-10-CM

## 2023-03-13 DIAGNOSIS — M25.511 ACUTE PAIN OF RIGHT SHOULDER: ICD-10-CM

## 2023-03-13 DIAGNOSIS — R68.89 IMPAIRED FUNCTION OF UPPER EXTREMITY: ICD-10-CM

## 2023-03-13 DIAGNOSIS — M25.611 IMPAIRED RANGE OF MOTION OF RIGHT SHOULDER: Primary | ICD-10-CM

## 2023-03-13 DIAGNOSIS — S49.91XA INJURY OF RIGHT SHOULDER, INITIAL ENCOUNTER: ICD-10-CM

## 2023-03-13 PROCEDURE — 99213 PR OFFICE/OUTPT VISIT, EST, LEVL III, 20-29 MIN: ICD-10-PCS | Mod: S$PBB,,, | Performed by: NURSE PRACTITIONER

## 2023-03-13 PROCEDURE — 99999 PR PBB SHADOW E&M-EST. PATIENT-LVL II: ICD-10-PCS | Mod: PBBFAC,,, | Performed by: NURSE PRACTITIONER

## 2023-03-13 PROCEDURE — 99213 OFFICE O/P EST LOW 20 MIN: CPT | Mod: S$PBB,,, | Performed by: NURSE PRACTITIONER

## 2023-03-13 PROCEDURE — 73030 X-RAY EXAM OF SHOULDER: CPT | Mod: 26,RT,, | Performed by: RADIOLOGY

## 2023-03-13 PROCEDURE — 97110 THERAPEUTIC EXERCISES: CPT | Mod: PO

## 2023-03-13 PROCEDURE — 99999 PR PBB SHADOW E&M-EST. PATIENT-LVL II: CPT | Mod: PBBFAC,,, | Performed by: NURSE PRACTITIONER

## 2023-03-13 PROCEDURE — 73030 XR SHOULDER COMPLETE 2 OR MORE VIEWS RIGHT: ICD-10-PCS | Mod: 26,RT,, | Performed by: RADIOLOGY

## 2023-03-13 PROCEDURE — 97140 MANUAL THERAPY 1/> REGIONS: CPT | Mod: PO

## 2023-03-13 PROCEDURE — 73030 X-RAY EXAM OF SHOULDER: CPT | Mod: TC,RT

## 2023-03-13 NOTE — PROGRESS NOTES
[facility]   Physical Therapy Treatment Note / Reassessment    Name: Yusuf Marie  Clinic Number: [id number]    Therapy Diagnosis:   Encounter Diagnoses   Name Primary?    Impaired range of motion of right shoulder Yes    Injury of right shoulder, initial encounter     Impaired function of upper extremity          Physician: Yrn Nugent NP    Visit Date: 3/13/2023    Physician: Yrn Nugent NP     Physician Orders: PT Eval and Treat   Medical Diagnosis from Referral: S42.294D (ICD-10-CM) - Other closed nondisplaced fracture of proximal end of right humerus with routine healing, subsequent encounter   Evaluation Date: 10/4/2022  Authorization Period Expiration: 3/4/2023  Plan of Care Expiration: 1/4/2023 extend POC to 3/31/23  Progress Note Due: 4/13/23  Visit # / Visits authorized: 14 /20 , 15/20  FOTO: 3/3 - last completed on 3/1/23       Precautions: Standard and Fall     PTA Visit #: 0/5     Time In: 7:05  Time Out: 7:55  Total Billable Time: 50 minutes    SUBJECTIVE     Pt reports: had some pain in the shoulder last night and this morning. Has been experimenting with sleeping on the right side.   Pt is compliant w/HEP given to him  Response to previous treatment: arm felt great   Functional change: Using the arm functionally but not carrying anything heavy.     Pain: 0/10 at rest  Location: right shoulder        OBJECTIVE     R elbow resting position neutral into supination pronation and full extension withou complaints      R shoulder AROM/ strength  Flexion 170; 4/5  Abduction 170; 4+/5  ER 73; 4-/5     L shoulder AROM:  Flexion 175  Abduction 170  ER 65    Treatment     Yusuf received the treatments listed below:    BOLD INDICATES ACTIVITIES PERFORMED / DISCUSSED    therapeutic exercises to develop strength, endurance, ROM, flexibility, and posture for 40 minutes including:    UBE x3 min fwd and back  Open books (opening to right) 20x  AROM shoulder flexion  "with green dowel x 20 supine with 3" towel roll at T4 level to promote extension.   AROM shoulder flexion with supination green dowel x20  SL trio 2# 2x10 R  seated bilateral shoulder external rotation with green theraband 3x8   shoulder external rotation with red theraband 3x8  Standing shoulder eccentric scaption with 3# 2x10 R  Chest press on incline bench with 9# DB on each hand 3x10  Y's in bent over row position 3x8 B  Overhead waiters carry 10# KB x2 laps on green  Body blade @ 90 degree of shoulder flexion 3x30"    standing shoulder flexion (fwd punch) at freemotion 7# 2x10  hand walks over 4" box in plank position x10  Standing at wall serratus slide into flexion with pillow case with 3 secs stretch at top of the range x20  Standing wall slide into abduction and flexion x10 each  Ball on wall stability circles CC/CCW x30    +internal rotation stretch with purple superband 10x10"    Therapeutic activity for 10 minutes:  ball chest pass 3x45" with rest breaks between  trampoline ball toss in standing 3x15 throws   Teran machine overhead press 3x6 (bar, +5, +10)    manual therapy techniques 0 min:  GHJ inferior glides and posterior mob grade 2-3  GH traction  PROM in all planes with oscillation   STM:  levator, brachialis, biceps long head tendon, pronator teres, pectoralis minor subclavius and teres minor.  Grade II/III mobs to AC and SC joint  GH and scapulothoracic traction  ER / IR mobs with the shoulder at 90 and at 45 degree positions   -GH inferior and posterior mobs grades III  -external rotation contract relax stretching       MODALITY: Ice to the right shoulder 0' post session    Patient Education and Home Exercises     Home Exercises Provided and Patient Education Provided     Education provided: advised to apply cold pack later tonight to help with recovery and reduce inflammation.     Written Home Exercises Provided: yes, see in media    ASSESSMENT     Mr. Goldberg returns today with some " discomfort in the shoulder especially when trying to sleep on the right side which was his normal sleeping position prior to shoulder injury. Reassessment reveals that his range of motion continues to improve and is nearing full however he remains with a lack of shoulder strength at this time. He is tolerating new exercises and stretching well with only mild discomfort. Plan to continue to progress strengthening of the upper extremity and introduce functional activity so that he may achieve his goal of returning to boxing.     Yusuf Is progressing well towards his goals.  Pt prognosis is Good.    Pt will continue to benefit from skilled outpatient physical therapy to address the deficits listed in the problem list box on initial evaluation, provide pt/family education and to maximize pt's level of independence in the home and community environment.     Pt's spiritual, cultural and educational needs considered and pt agreeable to plan of care and goals.    Anticipated barriers to physical therapy: R UE dominant    Goals:     Short Term Goals: 4 weeks   Patient will be able to perform AROM to 90 degrees in flex and abd without pain. MET 1/4/23  2. Assess MMT when applicable and set goals accordingly. MET 1/4/23  3. Patient will show 50% reduction in muscle tone around the right shoulder complex. MET 2/2/23  4. Patient will comply with home exercise program at all times. MET 2/2/23     Long Term Goals: 8 weeks   Patient will be able to don jackets without right shoulder pain. MET 1/4/23  2. Patient will be able to reach overhead to store dishes and groceries without pain increasing. MET 2/2/23  3. Patient will be able to safely carry > 15# with RUE without pain increasing. MET 1/31/2023  4. 50% FOTO score improvement. Progressing 2/6/23  5. New LTG: R shoulder flexion and abduction 4+/5 and ER 4/5. Progressing 1/31/23      PLAN     Cont. PT POC with current plan for 3/31/23 with 2 x per week for 8 weeks for total 16  visits    Kathe Nino, PT
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
## 2023-03-24 ENCOUNTER — CLINICAL SUPPORT (OUTPATIENT)
Dept: REHABILITATION | Facility: HOSPITAL | Age: 63
End: 2023-03-24
Attending: NURSE PRACTITIONER
Payer: COMMERCIAL

## 2023-03-24 DIAGNOSIS — R68.89 IMPAIRED FUNCTION OF UPPER EXTREMITY: ICD-10-CM

## 2023-03-24 DIAGNOSIS — S49.91XA INJURY OF RIGHT SHOULDER, INITIAL ENCOUNTER: ICD-10-CM

## 2023-03-24 DIAGNOSIS — M25.611 IMPAIRED RANGE OF MOTION OF RIGHT SHOULDER: Primary | ICD-10-CM

## 2023-03-24 PROCEDURE — 97530 THERAPEUTIC ACTIVITIES: CPT | Mod: PO,CQ

## 2023-03-24 PROCEDURE — 97110 THERAPEUTIC EXERCISES: CPT | Mod: PO,CQ

## 2023-03-24 NOTE — PROGRESS NOTES
"[facility]   Physical Therapy Treatment Note    Name: Yusuf Marie  Clinic Number: [id number]    Therapy Diagnosis:   Encounter Diagnoses   Name Primary?    Impaired range of motion of right shoulder Yes    Injury of right shoulder, initial encounter     Impaired function of upper extremity          Physician: Yrn Nugent NP    Visit Date: 3/24/2023    Physician: Yrn Nugent NP     Physician Orders: PT Eval and Treat   Medical Diagnosis from Referral: S42.294D (ICD-10-CM) - Other closed nondisplaced fracture of proximal end of right humerus with routine healing, subsequent encounter   Evaluation Date: 10/4/2022  Authorization Period Expiration: 3/4/2023  Plan of Care Expiration: 1/4/2023 extend POC to 3/31/23  Progress Note Due: 4/13/23  Visit # / Visits authorized: 14 /20 , 17/20  FOTO: 3/3 - last completed on 3/1/23       Precautions: Standard and Fall     PTA Visit #: 1/5     Time In: 7:32  Time Out: 8:15  Total Billable Time: 43 minutes    SUBJECTIVE     Pt reports: no new complaints. Shoulder feels like it's getting stronger.    Pt is compliant w/HEP given to him  Response to previous treatment: arm felt great   Functional change: Using the arm functionally but not carrying anything heavy.     Pain: 0/10 at rest  Location: right shoulder        OBJECTIVE     R elbow resting position neutral into supination pronation and full extension withou complaints      R shoulder AROM/ strength  Flexion 170; 4/5  Abduction 170; 4+/5  ER 73; 4-/5     L shoulder AROM:  Flexion 175  Abduction 170  ER 65    Treatment     Yusuf received the treatments listed below:    BOLD INDICATES ACTIVITIES PERFORMED / DISCUSSED    therapeutic exercises to develop strength, endurance, ROM, flexibility, and posture for 33 minutes including:    UBE x3 min fwd and back  Open books (opening to right) 20x  AROM shoulder flexion with green dowel x 20 supine with 3" towel roll at T4 level to " "promote extension.   AROM shoulder flexion with supination green dowel x20  SL trio 2# 2x10 R  Seated bilateral shoulder external rotation with green theraband 3x8  Shoulder external rotation with red theraband 3x8  Standing shoulder eccentric scaption with 3# 2x10 R  Chest press on incline bench with 9# DB on each hand 3x10  Internal rotation stretch with purple superband 10x10"  Y's in bent over row position 2# 3x8 B  Overhead waiters carry 10# KB x2 laps on green  Body blade @ 90 degree of shoulder flexion 3x30"    standing shoulder flexion (fwd punch) at freemotion 7# 2x10  hand walks over 4" box in plank position x10  Standing at wall serratus slide into flexion with pillow case with 3 secs stretch at top of the range x20  Standing wall slide into abduction and flexion x10 each  Ball on wall stability circles CC/CCW x30    Therapeutic activity for 10 minutes:  ball chest pass 3x45" with rest breaks between  trampoline ball toss in standing 3x15 throws with yellow ball  Teran machine overhead press 3x6 (bar, +5, +10)    manual therapy techniques 0 min:  GHJ inferior glides and posterior mob grade 2-3  GH traction  PROM in all planes with oscillation   STM:  levator, brachialis, biceps long head tendon, pronator teres, pectoralis minor subclavius and teres minor.  Grade II/III mobs to AC and SC joint  GH and scapulothoracic traction  ER / IR mobs with the shoulder at 90 and at 45 degree positions   -GH inferior and posterior mobs grades III  -external rotation contract relax stretching       MODALITY: Ice to the right shoulder 0' post session    Patient Education and Home Exercises     Home Exercises Provided and Patient Education Provided     Education provided: advised to apply cold pack later tonight to help with recovery and reduce inflammation.     Written Home Exercises Provided: yes, see in media    ASSESSMENT     Mr. Goldberg tolerated treatment well today. He remains with some tightness with R shoulder " internal rotation that seems to improve following stretches. Encouraged for compliance at home with HEP and especially with stretches. He is tolerates new exercises and stretching well with only mild discomfort. Plan to continue to progress strengthening of the upper extremity and introduce functional activity so that he may achieve his goal of returning to boxing.     Yusuf Is progressing well towards his goals.  Pt prognosis is Good.    Pt will continue to benefit from skilled outpatient physical therapy to address the deficits listed in the problem list box on initial evaluation, provide pt/family education and to maximize pt's level of independence in the home and community environment.     Pt's spiritual, cultural and educational needs considered and pt agreeable to plan of care and goals.    Anticipated barriers to physical therapy: R UE dominant    Goals:     Short Term Goals: 4 weeks   Patient will be able to perform AROM to 90 degrees in flex and abd without pain. MET 1/4/23  2. Assess MMT when applicable and set goals accordingly. MET 1/4/23  3. Patient will show 50% reduction in muscle tone around the right shoulder complex. MET 2/2/23  4. Patient will comply with home exercise program at all times. MET 2/2/23     Long Term Goals: 8 weeks   Patient will be able to don jackets without right shoulder pain. MET 1/4/23  2. Patient will be able to reach overhead to store dishes and groceries without pain increasing. MET 2/2/23  3. Patient will be able to safely carry > 15# with RUE without pain increasing. MET 1/31/2023  4. 50% FOTO score improvement. Progressing 2/6/23  5. New LTG: R shoulder flexion and abduction 4+/5 and ER 4/5. Progressing 1/31/23      PLAN     Cont. PT POC with current plan for 3/31/23 with 2 x per week for 8 weeks for total 16 visits    Maricarmen Millan PTA

## 2023-03-30 ENCOUNTER — CLINICAL SUPPORT (OUTPATIENT)
Dept: REHABILITATION | Facility: HOSPITAL | Age: 63
End: 2023-03-30
Attending: NURSE PRACTITIONER
Payer: COMMERCIAL

## 2023-03-30 DIAGNOSIS — R68.89 IMPAIRED FUNCTION OF UPPER EXTREMITY: ICD-10-CM

## 2023-03-30 DIAGNOSIS — M25.611 IMPAIRED RANGE OF MOTION OF RIGHT SHOULDER: Primary | ICD-10-CM

## 2023-03-30 DIAGNOSIS — S49.91XA INJURY OF RIGHT SHOULDER, INITIAL ENCOUNTER: ICD-10-CM

## 2023-03-30 PROCEDURE — 97110 THERAPEUTIC EXERCISES: CPT | Mod: PO

## 2023-03-30 PROCEDURE — 97140 MANUAL THERAPY 1/> REGIONS: CPT | Mod: PO

## 2023-03-30 PROCEDURE — 97112 NEUROMUSCULAR REEDUCATION: CPT | Mod: PO

## 2023-03-30 NOTE — PROGRESS NOTES
[facility]   Physical Therapy Treatment Note    Name: Yusuf Marie  Clinic Number: [id number]    Therapy Diagnosis:   Encounter Diagnoses   Name Primary?    Impaired range of motion of right shoulder Yes    Injury of right shoulder, initial encounter     Impaired function of upper extremity            Physician: Yrn Nugent NP    Visit Date: 3/30/2023    Physician: Yrn Nugent NP     Physician Orders: PT Eval and Treat   Medical Diagnosis from Referral: S42.294D (ICD-10-CM) - Other closed nondisplaced fracture of proximal end of right humerus with routine healing, subsequent encounter   Evaluation Date: 10/4/2022  Authorization Period Expiration: 3/4/2023  Plan of Care Expiration: 1/4/2023 extend POC to 3/31/23  Progress Note Due: 4/13/23  Visit # / Visits authorized: 14 /20 , 17/20  FOTO: 3/3 - last completed on 3/1/23       Precautions: Standard and Fall     PTA Visit #: 0/5     Time In: 7:14  Time Out: 7:56  Total Billable Time: 42 minutes    SUBJECTIVE     Pt reports: daily intermittent pain. Feels like a tight ache in the lateral arm and forearm and sometimes in the wrist. The pain is deep and also present jest medial to the right scapula.     Pt is compliant w/HEP given to him  Response to previous treatment: arm felt great   Functional change: Using the arm functionally but not carrying anything heavy.     Pain: 0/10 at rest  Location: right shoulder        OBJECTIVE     R elbow resting position neutral into supination pronation and full extension withou complaints      R shoulder AROM/ strength  Flexion 170; 4/5  Abduction 170; 4+/5  ER 73; 4-/5     L shoulder AROM:  Flexion 175  Abduction 170  ER 65    Treatment     Yusuf received the treatments listed below:    BOLD INDICATES ACTIVITIES PERFORMED / DISCUSSED    therapeutic exercises to develop strength, endurance, ROM, flexibility, and posture for 23 minutes including:    UBE x3 min fwd and back  Open  "books (opening to right) 20x  AROM shoulder flexion with green dowel x 20 supine with 3" towel roll at T4 level to promote extension.   AROM shoulder flexion with supination green dowel x20  SL trio 2# 2x10 R  Seated bilateral shoulder external rotation with green theraband 3x8  Shoulder external rotation with red theraband 3x8  Standing shoulder eccentric scaption with 3# 3x8 R  Chest press on incline bench with 10# DB on each hand 2x10  Internal rotation stretch with purple superband 10x10"  Y's in bent over row position 2# 3x8 B  Overhead waiters carry 10# KB x2 laps on green  Body blade @ 90 degree of shoulder flexion 3x30"    standing shoulder flexion (fwd punch) at freemotion 7# 2x10  hand walks over 4" box in plank position x10  Standing at wall serratus slide into flexion with pillow case with 3 secs stretch at top of the range x20  Standing wall slide into abduction and flexion x10 each  Ball on wall stability circles CC/CCW x30    Therapeutic activity for 11 minutes:  ball chest pass 3x45" with rest breaks between  trampoline ball toss in standing 3x15 throws with yellow ball  Teran machine overhead press 5x6 (bar, +5, +10, +20, +20)  +med ball slams 8# x10    manual therapy techniques 0 min:  GHJ inferior glides and posterior mob grade 2-3  GH traction  PROM in all planes with oscillation   STM:  levator, brachialis, biceps long head tendon, pronator teres, pectoralis minor subclavius and teres minor.  Grade II/III mobs to AC and SC joint  GH and scapulothoracic traction  ER / IR mobs with the shoulder at 90 and at 45 degree positions   -GH inferior and posterior mobs grades III  -external rotation contract relax stretching     Manual therapy for 8 minutes  Hypervolt massage gun to thoracic paraspinals, infraspinatus, teres/lat at lateral border of scapula      MODALITY: Ice to the right shoulder 0' post session    Patient Education and Home Exercises     Home Exercises Provided and Patient Education " Provided     Education provided: advised to apply cold pack later tonight to help with recovery and reduce inflammation.     Written Home Exercises Provided: yes, see in media    ASSESSMENT     Mr. Goldberg presents today with some discomfort in the lateral arm, forearm, and wrist as noted above. We used hypervolt massage gun on periscapular musculature and infraspinatus which provided some relief to him. Followed this up with exercises with overall positive response today with little difficulty with progression of activity. He does have some discomfort during incline bench chest press when allowing elbows to move past the plane of the body. Progress as tolerated.     Yusuf Is progressing well towards his goals.  Pt prognosis is Good.    Pt will continue to benefit from skilled outpatient physical therapy to address the deficits listed in the problem list box on initial evaluation, provide pt/family education and to maximize pt's level of independence in the home and community environment.     Pt's spiritual, cultural and educational needs considered and pt agreeable to plan of care and goals.    Anticipated barriers to physical therapy: R UE dominant    Goals:     Short Term Goals: 4 weeks   Patient will be able to perform AROM to 90 degrees in flex and abd without pain. MET 1/4/23  2. Assess MMT when applicable and set goals accordingly. MET 1/4/23  3. Patient will show 50% reduction in muscle tone around the right shoulder complex. MET 2/2/23  4. Patient will comply with home exercise program at all times. MET 2/2/23     Long Term Goals: 8 weeks   Patient will be able to don jackets without right shoulder pain. MET 1/4/23  2. Patient will be able to reach overhead to store dishes and groceries without pain increasing. MET 2/2/23  3. Patient will be able to safely carry > 15# with RUE without pain increasing. MET 1/31/2023  4. 50% FOTO score improvement. Progressing 2/6/23  5. New LTG: R shoulder flexion and  abduction 4+/5 and ER 4/5. Progressing 1/31/23      PLAN     Cont. PT POC with current plan for 3/31/23 with 2 x per week for 8 weeks for total 16 visits    Kathe Nino PT

## 2023-04-04 ENCOUNTER — CLINICAL SUPPORT (OUTPATIENT)
Dept: REHABILITATION | Facility: HOSPITAL | Age: 63
End: 2023-04-04
Attending: NURSE PRACTITIONER

## 2023-04-04 DIAGNOSIS — S49.91XA INJURY OF RIGHT SHOULDER, INITIAL ENCOUNTER: ICD-10-CM

## 2023-04-04 DIAGNOSIS — M25.611 IMPAIRED RANGE OF MOTION OF RIGHT SHOULDER: Primary | ICD-10-CM

## 2023-04-04 DIAGNOSIS — R68.89 IMPAIRED FUNCTION OF UPPER EXTREMITY: ICD-10-CM

## 2023-04-04 PROCEDURE — 97110 THERAPEUTIC EXERCISES: CPT | Mod: PO,CQ

## 2023-04-04 PROCEDURE — 97530 THERAPEUTIC ACTIVITIES: CPT | Mod: PO,CQ

## 2023-04-04 NOTE — PROGRESS NOTES
"[facility]   Physical Therapy Treatment Note    Name: Yusuf Marie  Clinic Number: [id number]    Therapy Diagnosis:   Encounter Diagnoses   Name Primary?    Impaired range of motion of right shoulder Yes    Injury of right shoulder, initial encounter     Impaired function of upper extremity        Physician: Yrn Nugent NP    Visit Date: 4/4/2023    Physician: Yrn Nugent NP     Physician Orders: PT Eval and Treat   Medical Diagnosis from Referral: S42.294D (ICD-10-CM) - Other closed nondisplaced fracture of proximal end of right humerus with routine healing, subsequent encounter   Evaluation Date: 10/4/2022  Authorization Period Expiration: 3/4/2023  Plan of Care Expiration: 1/4/2023 extend POC to 3/31/23  Progress Note Due: 4/13/23  Visit # / Visits authorized: 14 /20 , 19/20  FOTO: 3/3 - last completed on 3/1/23       Precautions: Standard and Fall     PTA Visit #: 1/5     Time In: 7:45  Time Out: 8:35  Total Billable Time: 50 minutes    SUBJECTIVE     Pt reports: no pain right now but tightness.     Pt is compliant w/HEP given to him  Response to previous treatment: arm felt great   Functional change: Using the arm functionally but not carrying anything heavy.     Pain: 0/10 at rest  Location: right shoulder       OBJECTIVE     R elbow resting position neutral into supination pronation and full extension withou complaints      R shoulder AROM/ strength  Flexion 170; 4/5  Abduction 170; 4+/5  ER 73; 4-/5     L shoulder AROM:  Flexion 175  Abduction 170  ER 65    Treatment     Yusuf received the treatments listed below:    BOLD INDICATES ACTIVITIES PERFORMED / DISCUSSED    therapeutic exercises to develop strength, endurance, ROM, flexibility, and posture for 35 minutes including:    UBE x3 min fwd and back  Open books (opening to right) 20x  AROM shoulder flexion with green dowel x 20 supine with 3" towel roll at T4 level to promote extension.   AROM shoulder " "flexion with supination green dowel x20  SL trio 2# 2x10 R  Seated bilateral shoulder external rotation with green theraband 3x8  Shoulder external rotation with red theraband 3x8  Standing shoulder eccentric scaption with 3# 3x8 R  Chest press on incline bench with 10# DB on each hand 2x10  Internal rotation stretch with purple superband 10x10"  Y's in bent over row position 2# 3x8 B  Overhead waiters carry 10# KB x2 laps on green  Body blade @ 90 degree of shoulder flexion 3x30"    standing shoulder flexion (fwd punch) at freemotion 7# 2x10  hand walks over 4" box in plank position x10  Standing at wall serratus slide into flexion with pillow case with 3 secs stretch at top of the range x20  Standing wall slide into abduction and flexion x10 each  Ball on wall stability circles CC/CCW x30    Therapeutic activity for 15 minutes:  ball chest pass 3x45" with rest breaks between  trampoline ball toss in standing 3x15 throws with yellow ball  Teran machine overhead press 5x6 (bar, +5, +10, +20, +20)  Med ball slams 8# x10    manual therapy techniques 0 min:  GHJ inferior glides and posterior mob grade 2-3  GH traction  PROM in all planes with oscillation   STM:  levator, brachialis, biceps long head tendon, pronator teres, pectoralis minor subclavius and teres minor.  Grade II/III mobs to AC and SC joint  GH and scapulothoracic traction  ER / IR mobs with the shoulder at 90 and at 45 degree positions   -GH inferior and posterior mobs grades III  -external rotation contract relax stretching     Manual therapy for 0 minutes  Hypervolt massage gun to thoracic paraspinals, infraspinatus, teres/lat at lateral border of scapula    MODALITY: Ice to the right shoulder 0' post session    Patient Education and Home Exercises     Home Exercises Provided and Patient Education Provided     Education provided: advised to apply cold pack later tonight to help with recovery and reduce inflammation.     Written Home Exercises Provided: " yes, see in media    ASSESSMENT     Mr. Goldberg presents to treatment with no pain to report. He remains limited with R shoulder internal rotation which was addressed today with stretches and pt was encouraged for continued compliance at home. Pt tolerated treatment well with increased workload and no issues to report. Progress as tolerated.     Yusuf Is progressing well towards his goals.  Pt prognosis is Good.    Pt will continue to benefit from skilled outpatient physical therapy to address the deficits listed in the problem list box on initial evaluation, provide pt/family education and to maximize pt's level of independence in the home and community environment.     Pt's spiritual, cultural and educational needs considered and pt agreeable to plan of care and goals.    Anticipated barriers to physical therapy: R UE dominant    Goals:     Short Term Goals: 4 weeks   Patient will be able to perform AROM to 90 degrees in flex and abd without pain. MET 1/4/23  2. Assess MMT when applicable and set goals accordingly. MET 1/4/23  3. Patient will show 50% reduction in muscle tone around the right shoulder complex. MET 2/2/23  4. Patient will comply with home exercise program at all times. MET 2/2/23     Long Term Goals: 8 weeks   Patient will be able to don jackets without right shoulder pain. MET 1/4/23  2. Patient will be able to reach overhead to store dishes and groceries without pain increasing. MET 2/2/23  3. Patient will be able to safely carry > 15# with RUE without pain increasing. MET 1/31/2023  4. 50% FOTO score improvement. Progressing 2/6/23  5. New LTG: R shoulder flexion and abduction 4+/5 and ER 4/5. Progressing 1/31/23      PLAN     Cont. PT POC with current plan for 3/31/23 with 2 x per week for 8 weeks for total 16 visits    Maricarmen Millan PTA

## 2023-04-11 ENCOUNTER — CLINICAL SUPPORT (OUTPATIENT)
Dept: REHABILITATION | Facility: HOSPITAL | Age: 63
End: 2023-04-11
Attending: NURSE PRACTITIONER

## 2023-04-11 DIAGNOSIS — S49.91XA INJURY OF RIGHT SHOULDER, INITIAL ENCOUNTER: ICD-10-CM

## 2023-04-11 DIAGNOSIS — M25.611 IMPAIRED RANGE OF MOTION OF RIGHT SHOULDER: Primary | ICD-10-CM

## 2023-04-11 DIAGNOSIS — R68.89 IMPAIRED FUNCTION OF UPPER EXTREMITY: ICD-10-CM

## 2023-04-11 PROCEDURE — 97530 THERAPEUTIC ACTIVITIES: CPT | Mod: PO,CQ

## 2023-04-11 PROCEDURE — 97110 THERAPEUTIC EXERCISES: CPT | Mod: PO,CQ

## 2023-04-11 NOTE — PROGRESS NOTES
[facility]   Physical Therapy Treatment Note    Name: Yusuf Marie  Clinic Number: [id number]    Therapy Diagnosis:   Encounter Diagnoses   Name Primary?    Impaired range of motion of right shoulder Yes    Injury of right shoulder, initial encounter     Impaired function of upper extremity        Physician: Yrn Nugent NP    Visit Date: 4/11/2023    Physician: Yrn Nugent NP     Physician Orders: PT Eval and Treat   Medical Diagnosis from Referral: S42.294D (ICD-10-CM) - Other closed nondisplaced fracture of proximal end of right humerus with routine healing, subsequent encounter   Evaluation Date: 10/4/2022  Authorization Period Expiration: 3/4/2023  Plan of Care Expiration: 1/4/2023 extend POC to 3/31/23  Progress Note Due: 4/13/23  Visit # / Visits authorized: 14 /20 , 20/25  FOTO: 3/3 - last completed on 3/1/23       Precautions: Standard and Fall     PTA Visit #: 2/5     Time In: 7:45  Time Out: 8:32  Total Billable Time: 47 minutes    SUBJECTIVE     Pt reports: he experienced pain on Sunday. The R arm felt heavy and sometimes the pain goes down to the wrist.    Pt is compliant w/HEP given to him  Response to previous treatment: arm felt great   Functional change: Using the arm functionally but not carrying anything heavy.     Pain: 0/10 at rest  Location: right shoulder       OBJECTIVE     R elbow resting position neutral into supination pronation and full extension withou complaints      R shoulder AROM/ strength  Flexion 170; 4/5  Abduction 170; 4+/5  ER 73; 4-/5     L shoulder AROM:  Flexion 175  Abduction 170  ER 65    Treatment     Yusuf received the treatments listed below:    BOLD INDICATES ACTIVITIES PERFORMED / DISCUSSED    therapeutic exercises to develop strength, endurance, ROM, flexibility, and posture for 32 minutes including:    UBE x3 min fwd and back  Open books (opening to right) 20x  AROM shoulder flexion with green dowel x 20 supine with  "3" towel roll at T4 level to promote extension.   AROM shoulder flexion with supination green dowel x20  SL trio 2# 2x10 R  Seated bilateral shoulder external rotation with green theraband 3x8  Shoulder external rotation with red theraband 3x8  Standing shoulder eccentric scaption with 3# 3x8 R  Chest press on incline bench with 10# DB on each hand 2x10  Internal rotation stretch with purple superband 10x10"  Y's in bent over row position 2# 3x8 B  Overhead waiters carry 5# KB flexion/abduction x2 laps on green  Body blade @ 90 degree of shoulder flexion 3x30"    standing shoulder flexion (fwd punch) at freemotion 7# 2x10  hand walks over 4" box in plank position x10  Standing at wall serratus slide into flexion with pillow case with 3 secs stretch at top of the range x20  Standing wall slide into abduction and flexion x10 each  Ball on wall stability circles CC/CCW x30    Therapeutic activity for 15 minutes:  ball chest pass 3x45" with rest breaks between  trampoline ball toss in standing 3x15 throws with yellow ball  Teran machine overhead press 5x6 (bar, +5, +10, +20, +20)  Med ball slams 8# x10    manual therapy techniques 0 min:  GHJ inferior glides and posterior mob grade 2-3  GH traction  PROM in all planes with oscillation   STM:  levator, brachialis, biceps long head tendon, pronator teres, pectoralis minor subclavius and teres minor.  Grade II/III mobs to AC and SC joint  GH and scapulothoracic traction  ER / IR mobs with the shoulder at 90 and at 45 degree positions   -GH inferior and posterior mobs grades III  -external rotation contract relax stretching     Manual therapy for 0 minutes  Hypervolt massage gun to thoracic paraspinals, infraspinatus, teres/lat at lateral border of scapula    MODALITY: Ice to the right shoulder 0' post session    Patient Education and Home Exercises     Home Exercises Provided and Patient Education Provided     Education provided: advised to apply cold pack later tonight to " help with recovery and reduce inflammation.     Written Home Exercises Provided: yes, see in media    ASSESSMENT     Mr. Goldberg presents to treatment with no pain to report. He remains with good tolerance to current treatment plan with no issues or increase in pain and with good carryover with exercises. He does reports muscle fatigue indicating good therapeutic effect. Pt remains limited with R shoulder internal rotation which was addressed today with stretches and pt was encouraged for continued compliance at home. Progress as tolerated.     Yusuf Is progressing well towards his goals.  Pt prognosis is Good.    Pt will continue to benefit from skilled outpatient physical therapy to address the deficits listed in the problem list box on initial evaluation, provide pt/family education and to maximize pt's level of independence in the home and community environment.     Pt's spiritual, cultural and educational needs considered and pt agreeable to plan of care and goals.    Anticipated barriers to physical therapy: R UE dominant    Goals:     Short Term Goals: 4 weeks   Patient will be able to perform AROM to 90 degrees in flex and abd without pain. MET 1/4/23  2. Assess MMT when applicable and set goals accordingly. MET 1/4/23  3. Patient will show 50% reduction in muscle tone around the right shoulder complex. MET 2/2/23  4. Patient will comply with home exercise program at all times. MET 2/2/23     Long Term Goals: 8 weeks   Patient will be able to don jackets without right shoulder pain. MET 1/4/23  2. Patient will be able to reach overhead to store dishes and groceries without pain increasing. MET 2/2/23  3. Patient will be able to safely carry > 15# with RUE without pain increasing. MET 1/31/2023  4. 50% FOTO score improvement. Progressing 2/6/23  5. New LTG: R shoulder flexion and abduction 4+/5 and ER 4/5. Progressing 1/31/23      PLAN     Cont. PT POC with current plan for 3/31/23 with 2 x per week for 8  weeks for total 16 visits    Maricarmen Millan, PTA

## 2023-04-17 ENCOUNTER — CLINICAL SUPPORT (OUTPATIENT)
Dept: REHABILITATION | Facility: HOSPITAL | Age: 63
End: 2023-04-17
Attending: NURSE PRACTITIONER

## 2023-04-17 DIAGNOSIS — M25.611 IMPAIRED RANGE OF MOTION OF RIGHT SHOULDER: Primary | ICD-10-CM

## 2023-04-17 DIAGNOSIS — R68.89 IMPAIRED FUNCTION OF UPPER EXTREMITY: ICD-10-CM

## 2023-04-17 DIAGNOSIS — S49.91XA INJURY OF RIGHT SHOULDER, INITIAL ENCOUNTER: ICD-10-CM

## 2023-04-17 PROCEDURE — 97110 THERAPEUTIC EXERCISES: CPT | Mod: PO

## 2023-04-17 PROCEDURE — 97530 THERAPEUTIC ACTIVITIES: CPT | Mod: PO

## 2023-04-17 NOTE — PLAN OF CARE
"[facility]   Physical Therapy Treatment Note / Reassessment / Updated Plan of Care    Name: Yusuf Marie  Clinic Number: [id number]    Therapy Diagnosis:   Encounter Diagnoses   Name Primary?    Impaired range of motion of right shoulder Yes    Injury of right shoulder, initial encounter     Impaired function of upper extremity          Physician: Yrn Nugent NP    Visit Date: 4/17/2023  Physician Orders: PT Eval and Treat   Medical Diagnosis from Referral: S42.294D (ICD-10-CM) - Other closed nondisplaced fracture of proximal end of right humerus with routine healing, subsequent encounter   Evaluation Date: 10/4/2022  Authorization Period Expiration: 3/4/2023  Plan of Care Expiration: 1/4/2023 extend POC to 7/17/23  Progress Note Due: 5/17/23  Visit # / Visits authorized: 14 /20 , 21/25  FOTO: 3/3 - last completed on 3/1/23       Precautions: Standard and Fall     PTA Visit #: 0/5     Time In: 7:45  Time Out: 8:34  Total Billable Time: 49 minutes    SUBJECTIVE     Pt reports: arm is getting better but still gets pain from time to danielle     Pt is compliant w/HEP given to him  Response to previous treatment: arm felt great   Functional change: Using the arm functionally but not carrying anything heavy.     Pain: 0/10 at rest  Location: right shoulder       OBJECTIVE     R elbow resting position neutral into supination pronation and full extension without complaints      R shoulder AROM/ strength  Flexion 180; 4+/5  Abduction 180; 4+/5  ER 88; 4+/5  INTERNAL ROTATION: 5/5     L shoulder AROM:  Flexion 175  Abduction 170  ER 65    Treatment     Yusuf received the treatments listed below:    BOLD INDICATES ACTIVITIES PERFORMED / DISCUSSED    therapeutic exercises to develop strength, endurance, ROM, flexibility, and posture for 11 minutes including:    UBE x3 min fwd and back  Open books (opening to right) 20x  AROM shoulder flexion with green dowel x 20 supine with 3" towel roll " "at T4 level to promote extension.   AROM shoulder flexion with supination green dowel x20  SL trio 2# 2x10 R  Seated bilateral shoulder external rotation with green theraband 3x8  Shoulder external rotation with red theraband 3x8  Standing shoulder eccentric scaption with 3# 3x8 R  Chest press on incline bench with 10# DB on each hand 2x10  Internal rotation stretch with purple superband 10x10"  Y's in bent over row position 2# 3x8 B  Overhead waiters carry 10# KB flexion/abduction x2 laps on green  Body blade @ 90 degree of shoulder flexion 3x30"  +face pulls with external rotation using red theraband x15  +shoulder external rotation with green theraband 2x15 B    standing shoulder flexion (fwd punch) at freemotion 7# 2x10  hand walks over 4" box in plank position x10  Standing at wall serratus slide into flexion with pillow case with 3 secs stretch at top of the range x20  Standing wall slide into abduction and flexion x10 each  Ball on wall stability circles CC/CCW x30    Therapeutic activity for 38 minutes:  ball chest pass 3x45" with rest breaks between  trampoline ball toss in standing 3x15 throws with yellow ball  Teran machine overhead press 5x6 (bar, +10, +20, +30, +40)  Smith machine bench press 3x8 (bar, +10, +20, +30)  Med ball slams 8# x10  Squat + 8# med ball toss at wall x15    manual therapy techniques 0 min:  GHJ inferior glides and posterior mob grade 2-3  GH traction  PROM in all planes with oscillation   STM:  levator, brachialis, biceps long head tendon, pronator teres, pectoralis minor subclavius and teres minor.  Grade II/III mobs to AC and SC joint  GH and scapulothoracic traction  ER / IR mobs with the shoulder at 90 and at 45 degree positions   -GH inferior and posterior mobs grades III  -external rotation contract relax stretching     Manual therapy for 0 minutes  Hypervolt massage gun to thoracic paraspinals, infraspinatus, teres/lat at lateral border of scapula    MODALITY: Ice to the " right shoulder 0' post session    Patient Education and Home Exercises     Home Exercises Provided and Patient Education Provided     Education provided: advised to apply cold pack later tonight to help with recovery and reduce inflammation.     Written Home Exercises Provided: yes, see in media    ASSESSMENT     Mr. Goldberg is doing very well today and is able to tolerate increase in workload including overhead. His range of motion is full in all planes with the exception of external rotation by approx 2 degrees. We will continue to progress through his final 4 visits and plan for discharge to [facility].     Yusuf Is progressing well towards his goals.  Pt prognosis is Good.    Pt will continue to benefit from skilled outpatient physical therapy to address the deficits listed in the problem list box on initial evaluation, provide pt/family education and to maximize pt's level of independence in the home and community environment.     Pt's spiritual, cultural and educational needs considered and pt agreeable to plan of care and goals.    Anticipated barriers to physical therapy: R UE dominant    Goals:     Short Term Goals: 4 weeks   Patient will be able to perform AROM to 90 degrees in flex and abd without pain. MET 1/4/23  2. Assess MMT when applicable and set goals accordingly. MET 1/4/23  3. Patient will show 50% reduction in muscle tone around the right shoulder complex. MET 2/2/23  4. Patient will comply with home exercise program at all times. MET 2/2/23     Long Term Goals: 8 weeks   Patient will be able to don jackets without right shoulder pain. MET 1/4/23  2. Patient will be able to reach overhead to store dishes and groceries without pain increasing. MET 2/2/23  3. Patient will be able to safely carry > 15# with RUE without pain increasing. MET 1/31/2023  4. 50% FOTO score improvement. Progressing 2/6/23  5. New LTG: R shoulder flexion and abduction 4+/5 and ER 4/5. Progressing 1/31/23      PLAN     Cont. PT  POC with current plan for 7/17/23 with 2 x per week for 6 weeks for total 12 visits    Kathe Nino, PT

## 2023-04-17 NOTE — PROGRESS NOTES
"[facility]   Physical Therapy Treatment Note / Reassessment / Updated Plan of Care    Name: Yusuf Marie  Clinic Number: [id number]    Therapy Diagnosis:   Encounter Diagnoses   Name Primary?    Impaired range of motion of right shoulder Yes    Injury of right shoulder, initial encounter     Impaired function of upper extremity          Physician: Yrn Nugent NP    Visit Date: 4/17/2023  Physician Orders: PT Eval and Treat   Medical Diagnosis from Referral: S42.294D (ICD-10-CM) - Other closed nondisplaced fracture of proximal end of right humerus with routine healing, subsequent encounter   Evaluation Date: 10/4/2022  Authorization Period Expiration: 3/4/2023  Plan of Care Expiration: 1/4/2023 extend POC to 7/17/23  Progress Note Due: 5/17/23  Visit # / Visits authorized: 14 /20 , 21/25  FOTO: 3/3 - last completed on 3/1/23       Precautions: Standard and Fall     PTA Visit #: 0/5     Time In: 7:45  Time Out: 8:34  Total Billable Time: 49 minutes    SUBJECTIVE     Pt reports: arm is getting better but still gets pain from time to danielle     Pt is compliant w/HEP given to him  Response to previous treatment: arm felt great   Functional change: Using the arm functionally but not carrying anything heavy.     Pain: 0/10 at rest  Location: right shoulder       OBJECTIVE     R elbow resting position neutral into supination pronation and full extension without complaints      R shoulder AROM/ strength  Flexion 180; 4+/5  Abduction 180; 4+/5  ER 88; 4+/5  INTERNAL ROTATION: 5/5     L shoulder AROM:  Flexion 175  Abduction 170  ER 65    Treatment     Yusuf received the treatments listed below:    BOLD INDICATES ACTIVITIES PERFORMED / DISCUSSED    therapeutic exercises to develop strength, endurance, ROM, flexibility, and posture for 11 minutes including:    UBE x3 min fwd and back  Open books (opening to right) 20x  AROM shoulder flexion with green dowel x 20 supine with 3" towel roll " "at T4 level to promote extension.   AROM shoulder flexion with supination green dowel x20  SL trio 2# 2x10 R  Seated bilateral shoulder external rotation with green theraband 3x8  Shoulder external rotation with red theraband 3x8  Standing shoulder eccentric scaption with 3# 3x8 R  Chest press on incline bench with 10# DB on each hand 2x10  Internal rotation stretch with purple superband 10x10"  Y's in bent over row position 2# 3x8 B  Overhead waiters carry 10# KB flexion/abduction x2 laps on green  Body blade @ 90 degree of shoulder flexion 3x30"  +face pulls with external rotation using red theraband x15  +shoulder external rotation with green theraband 2x15 B    standing shoulder flexion (fwd punch) at freemotion 7# 2x10  hand walks over 4" box in plank position x10  Standing at wall serratus slide into flexion with pillow case with 3 secs stretch at top of the range x20  Standing wall slide into abduction and flexion x10 each  Ball on wall stability circles CC/CCW x30    Therapeutic activity for 38 minutes:  ball chest pass 3x45" with rest breaks between  trampoline ball toss in standing 3x15 throws with yellow ball  Teran machine overhead press 5x6 (bar, +10, +20, +30, +40)  Smith machine bench press 3x8 (bar, +10, +20, +30)  Med ball slams 8# x10  Squat + 8# med ball toss at wall x15    manual therapy techniques 0 min:  GHJ inferior glides and posterior mob grade 2-3  GH traction  PROM in all planes with oscillation   STM:  levator, brachialis, biceps long head tendon, pronator teres, pectoralis minor subclavius and teres minor.  Grade II/III mobs to AC and SC joint  GH and scapulothoracic traction  ER / IR mobs with the shoulder at 90 and at 45 degree positions   -GH inferior and posterior mobs grades III  -external rotation contract relax stretching     Manual therapy for 0 minutes  Hypervolt massage gun to thoracic paraspinals, infraspinatus, teres/lat at lateral border of scapula    MODALITY: Ice to the " right shoulder 0' post session    Patient Education and Home Exercises     Home Exercises Provided and Patient Education Provided     Education provided: advised to apply cold pack later tonight to help with recovery and reduce inflammation.     Written Home Exercises Provided: yes, see in media    ASSESSMENT     Mr. Goldberg is doing very well today and is able to tolerate increase in workload including overhead. His range of motion is full in all planes with the exception of external rotation by approx 2 degrees. We will continue to progress through his final 4 visits and plan for discharge to [facility].     Yusuf Is progressing well towards his goals.  Pt prognosis is Good.    Pt will continue to benefit from skilled outpatient physical therapy to address the deficits listed in the problem list box on initial evaluation, provide pt/family education and to maximize pt's level of independence in the home and community environment.     Pt's spiritual, cultural and educational needs considered and pt agreeable to plan of care and goals.    Anticipated barriers to physical therapy: R UE dominant    Goals:     Short Term Goals: 4 weeks   Patient will be able to perform AROM to 90 degrees in flex and abd without pain. MET 1/4/23  2. Assess MMT when applicable and set goals accordingly. MET 1/4/23  3. Patient will show 50% reduction in muscle tone around the right shoulder complex. MET 2/2/23  4. Patient will comply with home exercise program at all times. MET 2/2/23     Long Term Goals: 8 weeks   Patient will be able to don jackets without right shoulder pain. MET 1/4/23  2. Patient will be able to reach overhead to store dishes and groceries without pain increasing. MET 2/2/23  3. Patient will be able to safely carry > 15# with RUE without pain increasing. MET 1/31/2023  4. 50% FOTO score improvement. Progressing 2/6/23  5. New LTG: R shoulder flexion and abduction 4+/5 and ER 4/5. Progressing 1/31/23      PLAN     Cont. PT  POC with current plan for 7/17/23 with 2 x per week for 6 weeks for total 12 visits    Kathe Nino, PT

## 2023-05-02 ENCOUNTER — CLINICAL SUPPORT (OUTPATIENT)
Dept: REHABILITATION | Facility: HOSPITAL | Age: 63
End: 2023-05-02
Attending: NURSE PRACTITIONER

## 2023-05-02 DIAGNOSIS — R68.89 IMPAIRED FUNCTION OF UPPER EXTREMITY: ICD-10-CM

## 2023-05-02 DIAGNOSIS — S49.91XA INJURY OF RIGHT SHOULDER, INITIAL ENCOUNTER: ICD-10-CM

## 2023-05-02 DIAGNOSIS — M25.611 IMPAIRED RANGE OF MOTION OF RIGHT SHOULDER: Primary | ICD-10-CM

## 2023-05-02 PROCEDURE — 97140 MANUAL THERAPY 1/> REGIONS: CPT | Mod: PO

## 2023-05-02 PROCEDURE — 97530 THERAPEUTIC ACTIVITIES: CPT | Mod: PO

## 2023-05-02 PROCEDURE — 97110 THERAPEUTIC EXERCISES: CPT | Mod: PO

## 2023-05-02 NOTE — PROGRESS NOTES
[facility]   Physical Therapy Treatment Note / Reassessment / Updated Plan of Care    Name: Yusuf Marie  Clinic Number: [id number]    Therapy Diagnosis:   Encounter Diagnoses   Name Primary?    Impaired range of motion of right shoulder Yes    Injury of right shoulder, initial encounter     Impaired function of upper extremity          Physician: Yrn Nugent NP    Visit Date: 5/2/2023  Physician Orders: PT Eval and Treat   Medical Diagnosis from Referral: S42.294D (ICD-10-CM) - Other closed nondisplaced fracture of proximal end of right humerus with routine healing, subsequent encounter   Evaluation Date: 10/4/2022  Authorization Period Expiration: 3/4/2023  Plan of Care Expiration: 1/4/2023 extend POC to 7/17/23  Progress Note Due: 5/17/23  Visit # / Visits authorized: 14 /20 , 21/25  FOTO: 3/3 - last completed on 3/1/23       Precautions: Standard and Fall     PTA Visit #: 0/5     Time In: 7:45  Time Out: 8:30  Total Billable Time: 45 minutes    SUBJECTIVE     Pt reports: arm is getting better but still gets pain from time to time, feeling stronger and feeling like his range of motion is more even between the sides    Pt is compliant w/HEP given to him  Response to previous treatment: arm felt great   Functional change: Using the arm functionally but not carrying anything heavy.     Pain: 0/10 at rest  Location: right shoulder       OBJECTIVE     R elbow resting position neutral into supination pronation and full extension without complaints      R shoulder AROM/ strength  Flexion 180; 4+/5  Abduction 180; 4+/5  ER 88; 4+/5  INTERNAL ROTATION: 5/5     L shoulder AROM:  Flexion 175  Abduction 170  ER 65    Treatment     Yusuf received the treatments listed below:    BOLD INDICATES ACTIVITIES PERFORMED / DISCUSSED    therapeutic exercises to develop strength, endurance, ROM, flexibility, and posture for 11 minutes including:    UBE x3 min fwd and back  Open books (opening  "to right) 20x - YTB today  AROM shoulder flexion with green dowel x 20 supine with 3" towel roll at T4 level to promote extension.   AROM shoulder flexion with supination green dowel x20  SL trio 2# 2x10 R  Seated bilateral shoulder external rotation with green theraband 3x8  Shoulder external rotation with red theraband 3x8  Standing shoulder eccentric scaption with 3# 3x8 R  Chest press on incline bench with 10# DB on each hand 2x10  Internal rotation stretch with purple superband 10x10"  Y's in bent over row position 2# 3x8 B  Overhead waiters carry 10# KB flexion/abduction x2 laps on green  Body blade @ 90 degree of shoulder flexion 3x30"  face pulls with external rotation using red theraband x15  shoulder external rotation with green theraband 2x15 B    standing shoulder flexion (fwd punch) at freemotion 7# 2x10  hand walks over 4" box in plank position x10  Standing at wall serratus slide into flexion with pillow case with 3 secs stretch at top of the range x20  Standing wall slide into abduction and flexion x10 each  Ball on wall stability circles CC/CCW x30    Therapeutic activity for 25 minutes:  ball chest pass 3x45" with rest breaks between  trampoline ball toss in standing 3x15 throws with yellow ball  Rotational trampoline tosses 3X 15 each side blue ball   [employer] machine overhead press 5x6 (bar, +10, +20, +30, +40)  Smith machine bench press 3x8 (bar, +10, +20, +30)  Med ball slams 8# x10  Squat + 8# med ball toss at wall 2x15 - focused on weight absorption when catching weighted ball    manual therapy techniques 08 min:  GHJ inferior glides and posterior mob grade 2-3  GH traction  PROM in all planes with oscillation   STM to pec complex  STM:  levator, brachialis, biceps long head tendon, pronator teres, pectoralis minor subclavius and teres minor.  Grade II/III mobs to AC and SC joint  GH and scapulothoracic traction  ER / IR mobs with the shoulder at 90 and at 45 degree positions   -GH inferior and " posterior mobs grades III  -external rotation contract relax stretching     Manual therapy for 0 minutes  Hypervolt massage gun to thoracic paraspinals, infraspinatus, teres/lat at lateral border of scapula    MODALITY: Ice to the right shoulder 0' post session    Patient Education and Home Exercises     Home Exercises Provided and Patient Education Provided     Education provided: advised to apply cold pack later tonight to help with recovery and reduce inflammation.     Written Home Exercises Provided: yes, see in media    ASSESSMENT     Yusfu is making excellent overall progress in his therapy and is showing significant improvement in range of motion and strengthening program. He continues to have some stiffness at times but reports 80% improvement overall.     Yusuf Is progressing well towards his goals.  Pt prognosis is Good.    Pt will continue to benefit from skilled outpatient physical therapy to address the deficits listed in the problem list box on initial evaluation, provide pt/family education and to maximize pt's level of independence in the home and community environment.     Pt's spiritual, cultural and educational needs considered and pt agreeable to plan of care and goals.    Anticipated barriers to physical therapy: R UE dominant    Goals:     Short Term Goals: 4 weeks   Patient will be able to perform AROM to 90 degrees in flex and abd without pain. MET 1/4/23  2. Assess MMT when applicable and set goals accordingly. MET 1/4/23  3. Patient will show 50% reduction in muscle tone around the right shoulder complex. MET 2/2/23  4. Patient will comply with home exercise program at all times. MET 2/2/23     Long Term Goals: 8 weeks   Patient will be able to don jackets without right shoulder pain. MET 1/4/23  2. Patient will be able to reach overhead to store dishes and groceries without pain increasing. MET 2/2/23  3. Patient will be able to safely carry > 15# with RUE without pain increasing. MET  1/31/2023  4. 50% FOTO score improvement. Progressing 2/6/23  5. New LTG: R shoulder flexion and abduction 4+/5 and ER 4/5. Progressing 1/31/23      PLAN     Cont. PT POC with current plan for 7/17/23 with 2 x per week for 6 weeks for total 12 visits    Kasandra Sewell, PT

## 2023-05-08 ENCOUNTER — CLINICAL SUPPORT (OUTPATIENT)
Dept: REHABILITATION | Facility: HOSPITAL | Age: 63
End: 2023-05-08
Attending: NURSE PRACTITIONER

## 2023-05-08 DIAGNOSIS — R68.89 IMPAIRED FUNCTION OF UPPER EXTREMITY: ICD-10-CM

## 2023-05-08 DIAGNOSIS — M25.611 IMPAIRED RANGE OF MOTION OF RIGHT SHOULDER: Primary | ICD-10-CM

## 2023-05-08 DIAGNOSIS — S49.91XA INJURY OF RIGHT SHOULDER, INITIAL ENCOUNTER: ICD-10-CM

## 2023-05-08 PROCEDURE — 97140 MANUAL THERAPY 1/> REGIONS: CPT | Mod: PO

## 2023-05-08 PROCEDURE — 97110 THERAPEUTIC EXERCISES: CPT | Mod: PO

## 2023-05-08 NOTE — PROGRESS NOTES
"[facility]   Physical Therapy Treatment Note    Name: Yusuf Marie  Clinic Number: [id number]    Therapy Diagnosis:   Encounter Diagnoses   Name Primary?    Impaired range of motion of right shoulder Yes    Injury of right shoulder, initial encounter     Impaired function of upper extremity            Physician: Yrn Nugent NP    Visit Date: 5/8/2023  Physician Orders: PT Eval and Treat   Medical Diagnosis from Referral: S42.294D (ICD-10-CM) - Other closed nondisplaced fracture of proximal end of right humerus with routine healing, subsequent encounter   Evaluation Date: 10/4/2022  Authorization Period Expiration: 3/4/2023  Plan of Care Expiration: 1/4/2023 extend POC to 7/17/23  Progress Note Due: 5/17/23  Visit # / Visits authorized: 14 /20 , 21/25  FOTO: 4/3 - last completed on 5/8/23       Precautions: Standard and Fall     PTA Visit #: 0/5     Time In: 7:57  Time Out: 8:31  Total Billable Time: 34 minutes    SUBJECTIVE     Pt reports: felt a throbbing pain in around the elbow this morning    Pt is compliant w/HEP given to him  Response to previous treatment: arm felt great   Functional change: Using the arm functionally but not carrying anything heavy.     Pain: 0/10 at rest  Location: right shoulder       OBJECTIVE     R elbow resting position neutral into supination pronation and full extension without complaints      R shoulder AROM/ strength  Flexion 180; 4+/5  Abduction 180; 4+/5  ER 88; 4+/5  INTERNAL ROTATION: 5/5     L shoulder AROM:  Flexion 175  Abduction 170  ER 65    Treatment     Yusuf received the treatments listed below:    BOLD INDICATES ACTIVITIES PERFORMED / DISCUSSED    therapeutic exercises to develop strength, endurance, ROM, flexibility, and posture for 23 minutes including:    UBE x3 min fwd and back  Open books (opening to right) 20x - YTB today  AROM shoulder flexion with green dowel x 20 supine with 3" towel roll at T4 level to promote " "extension.   AROM shoulder flexion with supination green dowel x20  SL trio 2# 2x10 R  Seated bilateral shoulder external rotation with green theraband 3x8  Shoulder external rotation with red theraband 3x8  Standing shoulder eccentric scaption with 3# 3x8 R  Chest press on incline bench with 10# DB on each hand 2x10  Internal rotation stretch with purple superband 10x10"  Y's in bent over row position 2# 3x8 B  Overhead waiters carry 10# KB flexion/abduction x2 laps on green  Body blade @ 90 degree of shoulder flexion 3x30"  face pulls with external rotation using red theraband x15  shoulder external rotation with green theraband 2x15 B    standing shoulder flexion (fwd punch) at freemotion 7# 2x10  hand walks over 4" box in plank position x10  Standing at wall serratus slide into flexion with pillow case with 3 secs stretch at top of the range x20  Standing wall slide into abduction and flexion x10 each  Ball on wall stability circles CC/CCW x30    Therapeutic activity for 0 minutes:  ball chest pass 3x45" with rest breaks between  trampoline ball toss in standing 3x15 throws with yellow ball  Rotational trampoline tosses 3X 15 each side blue ball   [employer] machine overhead press 5x6 (bar, +10, +20, +30, +40)  Smith machine bench press 3x8 (bar, +10, +20, +30)  Med ball slams 8# x10  Squat + 8# med ball toss at wall 2x15 - focused on weight absorption when catching weighted ball    manual therapy techniques 11 min:  GHJ inferior glides and posterior mob grade 2-3  GH traction  PROM in all planes with oscillation   STM to pec complex  STM:  levator, brachialis, biceps long head tendon, pronator teres, pectoralis minor subclavius and teres minor.  Grade II/III mobs to AC and SC joint  GH and scapulothoracic traction  ER / IR mobs with the shoulder at 90 and at 45 degree positions   -GH inferior and posterior mobs grades III  -external rotation contract relax stretching     Manual therapy for 0 minutes  Hypervolt " massage gun to thoracic paraspinals, infraspinatus, teres/lat at lateral border of scapula    MODALITY: Ice to the right shoulder 0' post session    Patient Education and Home Exercises     Home Exercises Provided and Patient Education Provided     Education provided: advised to apply cold pack later tonight to help with recovery and reduce inflammation.     Written Home Exercises Provided: yes, see in media    ASSESSMENT     Yusuf is making excellent overall progress in his therapy and is showing significant improvement in range of motion and strengthening program. He continues to have some stiffness and intermittent pain at the elbow but this always resolves without intervention.  We will continue to progress for 1-2 additional visits prior to discharge.     Yusuf Is progressing well towards his goals.  Pt prognosis is Good.    Pt will continue to benefit from skilled outpatient physical therapy to address the deficits listed in the problem list box on initial evaluation, provide pt/family education and to maximize pt's level of independence in the home and community environment.     Pt's spiritual, cultural and educational needs considered and pt agreeable to plan of care and goals.    Anticipated barriers to physical therapy: R UE dominant    Goals:     Short Term Goals: 4 weeks   Patient will be able to perform AROM to 90 degrees in flex and abd without pain. MET 1/4/23  2. Assess MMT when applicable and set goals accordingly. MET 1/4/23  3. Patient will show 50% reduction in muscle tone around the right shoulder complex. MET 2/2/23  4. Patient will comply with home exercise program at all times. MET 2/2/23     Long Term Goals: 8 weeks   Patient will be able to don jackets without right shoulder pain. MET 1/4/23  2. Patient will be able to reach overhead to store dishes and groceries without pain increasing. MET 2/2/23  3. Patient will be able to safely carry > 15# with RUE without pain increasing. MET  1/31/2023  4. 50% FOTO score improvement. Progressing 2/6/23  5. New LTG: R shoulder flexion and abduction 4+/5 and ER 4/5. Progressing 1/31/23      PLAN     Cont. PT POC with current plan for 7/17/23 with 2 x per week for 6 weeks for total 12 visits    Kathe Nino, PT

## 2023-05-15 ENCOUNTER — CLINICAL SUPPORT (OUTPATIENT)
Dept: REHABILITATION | Facility: HOSPITAL | Age: 63
End: 2023-05-15
Attending: NURSE PRACTITIONER

## 2023-05-15 DIAGNOSIS — S49.91XA INJURY OF RIGHT SHOULDER, INITIAL ENCOUNTER: ICD-10-CM

## 2023-05-15 DIAGNOSIS — R68.89 IMPAIRED FUNCTION OF UPPER EXTREMITY: ICD-10-CM

## 2023-05-15 DIAGNOSIS — M25.611 IMPAIRED RANGE OF MOTION OF RIGHT SHOULDER: Primary | ICD-10-CM

## 2023-05-15 PROCEDURE — 97530 THERAPEUTIC ACTIVITIES: CPT | Mod: PO

## 2023-05-15 PROCEDURE — 97110 THERAPEUTIC EXERCISES: CPT | Mod: PO

## 2023-05-15 NOTE — PROGRESS NOTES
"[facility]   Physical Therapy Treatment Note    Name: Yusuf Marie  Clinic Number: [id number]    Therapy Diagnosis:   No diagnosis found.          Physician: Yrn Nugent NP    Visit Date: 5/15/2023  Physician Orders: PT Eval and Treat   Medical Diagnosis from Referral: S42.294D (ICD-10-CM) - Other closed nondisplaced fracture of proximal end of right humerus with routine healing, subsequent encounter   Evaluation Date: 10/4/2022  Authorization Period Expiration: 3/4/2023  Plan of Care Expiration: 1/4/2023 extend POC to 7/17/23  Progress Note Due: 5/17/23  Visit # / Visits authorized: 14 /20 , 24/25  FOTO: 6/3 - EPISODE COMPLETED AND CLOSED       Precautions: Standard and Fall     PTA Visit #: 0/5     Time In: 7:44  Time Out: 8:30  Total Billable Time: 46 minutes    SUBJECTIVE     Pt reports: Has had more moments with no pain than moments with pain. He had some fatigue yesterday when trying to grill. He feels like there is a milla in his arm.     Pt is compliant w/HEP given to him  Response to previous treatment: arm felt great   Functional change: Using the arm functionally but not carrying anything heavy.     Pain: 2/10 at rest  Location: right shoulder       OBJECTIVE     R elbow resting position neutral into supination pronation and full extension without complaints      R shoulder AROM/ strength  Flexion 180; 4+/5  Abduction 180; 4+/5  ER 88; 4+/5  INTERNAL ROTATION: 5/5     L shoulder AROM:  Flexion 175  Abduction 170  ER 65    Treatment     Yusuf received the treatments listed below:    BOLD INDICATES ACTIVITIES PERFORMED / DISCUSSED    therapeutic exercises to develop strength, endurance, ROM, flexibility, and posture for 8 minutes including:  UBE x3 min fwd and back  Open books (opening to right) 20x  AROM shoulder flexion with green dowel x 20 supine with 3" towel roll at T4 level to promote extension.   AROM shoulder flexion with supination green dowel x20  SL trio " "2# 2x10 R  Seated bilateral shoulder external rotation with green theraband 3x8  Shoulder external rotation with red theraband 3x8  Standing shoulder eccentric scaption with 3# 3x8 R  Chest press on incline bench with 10# DB on each hand 2x10  Internal rotation stretch with purple superband 10x10"  Y's in bent over row position 2# 3x8 B  Overhead waiters carry 10# KB flexion/abduction x2 laps on green  Body blade @ 90 degree of shoulder flexion 3x30"  face pulls with external rotation using red theraband x15  shoulder external rotation with green theraband 2x15 B    standing [facility]wd [facility]10  hand walks over 4" box in plank position x10  Standing at wall serratus slide into flexion with pillow case with 3 secs stretch at top of the range x20  Standing wall slide into abduction and flexion x10 each  Ball on wall stability circles CC/CCW x30    Therapeutic activity for 38 minutes:  ball chest pass 3x45" with rest breaks between  trampoline ball toss in standing 3x15 throws with blue ball  Rotational trampoline tosses 3X 15 each side blue ball   [employer] machine overhead press 5x6 (bar, +20, +30, +40, +40)  Smith machine bench press 4x8 (bar, +10, +20, +30, +30)  Med ball slams 8# x10  Squat + 8# med ball toss at wall 2x15 - focused on weight absorption when catching weighted ball    manual therapy techniques 0 min:  GHJ inferior glides and posterior mob grade 2-3  GH traction  PROM in all planes with oscillation   STM to pec complex  STM:  levator, brachialis, biceps long head tendon, pronator teres, pectoralis minor subclavius and teres minor.  Grade II/III mobs to AC and SC joint  GH and scapulothoracic traction  ER / IR mobs with the shoulder at 90 and at 45 degree positions   -GH inferior and posterior mobs grades III  -external rotation contract relax stretching     Manual therapy for 0 minutes  Hypervolt massage gun to thoracic paraspinals, infraspinatus, teres/lat at lateral " border of scapula    MODALITY: Ice to the right shoulder 0' post session    Patient Education and Home Exercises     Home Exercises Provided and Patient Education Provided     Education provided: advised to apply cold pack later tonight to help with recovery and reduce inflammation.     Written Home Exercises Provided: yes, see in media    ASSESSMENT     Yusuf with good tolerance to session today with muscle fatigue appropriate for amount of activity. Encouraged him to begin to return to some activities that he would like to do such as boxing. Reassessment to be performed at next visit. Progress as tolerated.     Yusuf Is progressing well towards his goals.  Pt prognosis is Good.    Pt will continue to benefit from skilled outpatient physical therapy to address the deficits listed in the problem list box on initial evaluation, provide pt/family education and to maximize pt's level of independence in the home and community environment.     Pt's spiritual, cultural and educational needs considered and pt agreeable to plan of care and goals.    Anticipated barriers to physical therapy: R UE dominant    Goals:     Short Term Goals: 4 weeks   Patient will be able to perform AROM to 90 degrees in flex and abd without pain. MET 1/4/23  2. Assess MMT when applicable and set goals accordingly. MET 1/4/23  3. Patient will show 50% reduction in muscle tone around the right shoulder complex. MET 2/2/23  4. Patient will comply with home exercise program at all times. MET 2/2/23     Long Term Goals: 8 weeks   Patient will be able to don jackets without right shoulder pain. MET 1/4/23  2. Patient will be able to reach overhead to store dishes and groceries without pain increasing. MET 2/2/23  3. Patient will be able to safely carry > 15# with RUE without pain increasing. MET 1/31/2023  4. 50% FOTO score improvement. Progressing 2/6/23  5. New LTG: R shoulder flexion and abduction 4+/5 and ER 4/5. Progressing 1/31/23      PLAN      Cont. PT POC with current plan for 7/17/23 with 2 x per week for 6 weeks for total 12 visits    Kathe Nino PT

## 2023-05-24 ENCOUNTER — CLINICAL SUPPORT (OUTPATIENT)
Dept: REHABILITATION | Facility: HOSPITAL | Age: 63
End: 2023-05-24
Attending: NURSE PRACTITIONER

## 2023-05-24 DIAGNOSIS — S49.91XA INJURY OF RIGHT SHOULDER, INITIAL ENCOUNTER: ICD-10-CM

## 2023-05-24 DIAGNOSIS — M25.611 IMPAIRED RANGE OF MOTION OF RIGHT SHOULDER: Primary | ICD-10-CM

## 2023-05-24 DIAGNOSIS — R68.89 IMPAIRED FUNCTION OF UPPER EXTREMITY: ICD-10-CM

## 2023-05-24 PROCEDURE — 97110 THERAPEUTIC EXERCISES: CPT | Mod: PO

## 2023-05-24 PROCEDURE — 97530 THERAPEUTIC ACTIVITIES: CPT | Mod: PO

## 2023-05-24 NOTE — PROGRESS NOTES
[facility]   Physical Therapy Treatment Note/Discharge Visit    Name: Yusuf Marie  Clinic Number: [id number]    Therapy Diagnosis:   Encounter Diagnoses   Name Primary?    Impaired range of motion of right shoulder Yes    Injury of right shoulder, initial encounter     Impaired function of upper extremity              Physician: Yrn Nugent NP    Visit Date: 5/24/2023  Physician Orders: PT Eval and Treat   Medical Diagnosis from Referral: S42.294D (ICD-10-CM) - Other closed nondisplaced fracture of proximal end of right humerus with routine healing, subsequent encounter   Evaluation Date: 10/4/2022  Authorization Period Expiration: 3/4/2023  Plan of Care Expiration: 1/4/2023 extend POC to 7/17/23  Progress Note Due: 5/17/23  Visit # / Visits authorized: 14 /20 , 25/25  FOTO: 6/3 - EPISODE COMPLETED AND CLOSED       Precautions: Standard and Fall     PTA Visit #: 0/5     Time In: 8:13  Time Out: 8:45  Total Billable Time: 32 minutes    SUBJECTIVE     Pt reports: Has had more moments with no pain than moments with pain. He had some fatigue yesterday when trying to grill. He feels like there is a milla in his arm.     Pt is compliant w/HEP given to him  Response to previous treatment: arm felt great   Functional change: Using the arm functionally but not carrying anything heavy.     Pain: 2/10 at rest  Location: right shoulder       OBJECTIVE     R elbow resting position neutral into supination pronation and full extension without complaints      R shoulder AROM/ strength  Flexion 180; 4+/5  Abduction 180; 4+/5  ER 88; 4+/5  INTERNAL ROTATION: 5/5     L shoulder AROM:  Flexion 175  Abduction 170  ER 65    Treatment     Yusuf received the treatments listed below:    BOLD INDICATES ACTIVITIES PERFORMED / DISCUSSED    therapeutic exercises to develop strength, endurance, ROM, flexibility, and posture for 9 minutes including:  UBE x2 min fwd and back  Open books (opening to right)  "20x  AROM shoulder flexion with green dowel x 20 supine with 3" towel roll at T4 level to promote extension.   AROM shoulder flexion with supination green dowel x20  SL trio 2# 2x10 R  Seated bilateral shoulder external rotation with green theraband 3x8  Shoulder external rotation with red theraband 3x8  Standing shoulder eccentric scaption with 3# 3x8 R  Chest press on incline bench with 10# DB on each hand 2x10  Internal rotation stretch with purple superband 10x10"  Y's in bent over row position 2# 3x8 B  Overhead waiters carry 10# KB flexion/abduction x2 laps on green  Body blade @ 90 degree of shoulder flexion 3x30"  face pulls with external rotation using red theraband x15  shoulder external rotation with green theraband 2x15 B    standing shoulder flexion (fwd punch) at freemotion 7# 2x10  hand walks over 4" box in plank position x10  Standing at wall serratus slide into flexion with pillow case with 3 secs stretch at top of the range x20  Standing wall slide into abduction and flexion x10 each  Ball on wall stability circles yellow CC/CCW x30    Therapeutic activity for 23 minutes:  ball chest pass 3x45" with rest breaks between  trampoline ball toss in standing 3x15 throws with blue ball  Rotational trampoline tosses 3X 15 each side blue ball   Vascular Closure machine overhead press 5x6 (bar, +20, +30, +40, +40)  Smith machine bench press 4x8 (bar, +20, +25, +30, +40)  Med ball slams 8# x10  Squat + 8# med ball toss at wall 2x15 - focused on weight absorption when catching weighted ball    manual therapy techniques 0 min:  GHJ inferior glides and posterior mob grade 2-3  GH traction  PROM in all planes with oscillation   STM to pec complex  STM:  levator, brachialis, biceps long head tendon, pronator teres, pectoralis minor subclavius and teres minor.  Grade II/III mobs to AC and SC joint  GH and scapulothoracic traction  ER / IR mobs with the shoulder at 90 and at 45 degree positions   -GH inferior and posterior " mobs grades III  -external rotation contract relax stretching     Manual therapy for 0 minutes  Hypervolt massage gun to thoracic paraspinals, infraspinatus, teres/lat at lateral border of scapula    MODALITY: Ice to the right shoulder 0' post session    Patient Education and Home Exercises     Home Exercises Provided and Patient Education Provided     Education provided: advised to apply cold pack later tonight to help with recovery and reduce inflammation.     Written Home Exercises Provided: yes, see in media    ASSESSMENT     Yusuf has met all of his PT goals and performing very well functionally. He is safe for discharge to home exercise program today.     Yusuf Is progressing well towards his goals.  Pt prognosis is Good.    Pt will continue to benefit from skilled outpatient physical therapy to address the deficits listed in the problem list box on initial evaluation, provide pt/family education and to maximize pt's level of independence in the home and community environment.     Pt's spiritual, cultural and educational needs considered and pt agreeable to plan of care and goals.    Anticipated barriers to physical therapy: R UE dominant    Goals:     Short Term Goals: 4 weeks   Patient will be able to perform AROM to 90 degrees in flex and abd without pain. MET 1/4/23  2. Assess MMT when applicable and set goals accordingly. MET 1/4/23  3. Patient will show 50% reduction in muscle tone around the right shoulder complex. MET 2/2/23  4. Patient will comply with home exercise program at all times. MET 2/2/23     Long Term Goals: 8 weeks   Patient will be able to don jackets without right shoulder pain. MET 1/4/23  2. Patient will be able to reach overhead to store dishes and groceries without pain increasing. MET 2/2/23  3. Patient will be able to safely carry > 15# with RUE without pain increasing. MET 1/31/2023  4. 50% FOTO score improvement. MET 5/24/23  5. New LTG: R shoulder flexion and abduction 4+/5  and ER 4/5. MET 5/24/23      PLAN     Pt is discharged from skilled PT today.     Kathe Nino, PT

## 2023-12-14 NOTE — HPI
"Per Skyler Bradley, NP:  "The patient is a 60 year old male with no known past medical history who presented to the  ED with postprandial abd pain x 3 days, diffuse bilateral upper abdominal pain with nausea and vomiting x 1 and the emesis contained small blood clots.  In ED, VSS.  Pain 9/10. RUQ U/S: cholecytitis, +cholelithiasis with thickened GB wall (4.0 mm) and trace pericholecystic fluid, distended with 1.8cm stone at gallbladder neck, CBD wnl 3.4mm. Incidental probable hepatic hemangiomas (nonshadowing echogenic liver lesions). CT A/p with IV cont: GB distended with wall thickening, perichole fluid, 2.3cm stone near GB neck, c/w cholecystitis.  The patient was transferred to [facility] due to insurance concerns for cholecystitis and General Surgery consult."  "
Stable

## (undated) DEVICE — SOL NACL STRL BOTTLE 1000ML

## (undated) DEVICE — IRRIGATOR ENDOSCOPY DISP.

## (undated) DEVICE — SEE MEDLINE ITEM 156925

## (undated) DEVICE — KIT WING PAD POSITIONING

## (undated) DEVICE — EVACUATOR WOUND BULB 100CC

## (undated) DEVICE — PENCIL ELECTROSURG HOLST W/BLD

## (undated) DEVICE — TROCAR KII FIOS 5MM X 100MM

## (undated) DEVICE — TROCAR KII FIOS 11MM X 100MM

## (undated) DEVICE — DRESSING LEUKOPLAST FLEX 1X3IN

## (undated) DEVICE — ADHESIVE DERMABOND ADVANCED

## (undated) DEVICE — APPLIER CLIP EPIX UNIV 5X34

## (undated) DEVICE — SOL PVP-I SCRUB 7.5% 4OZ

## (undated) DEVICE — APPLICATOR HEMOBLAST LAPSCP

## (undated) DEVICE — SUT MCRYL PLUS 4-0 PS2 27IN

## (undated) DEVICE — SYR B-D DISP CONTROL 10CC100/C

## (undated) DEVICE — STRIP STERI REIN CLSR 1/2X2IN

## (undated) DEVICE — NDL INSUF ULTRA VERESS 120MM

## (undated) DEVICE — NDL HYPO REG 25G X 1 1/2

## (undated) DEVICE — KITTNER ENDOSCOPIC BLNT 5MM

## (undated) DEVICE — BELLOW CANN HEMOBLAST 1.65GR

## (undated) DEVICE — SUT VICRYL 0 27 CT-2

## (undated) DEVICE — ELECTRODE REM PLYHSV RETURN 9

## (undated) DEVICE — SOL NS 1000CC

## (undated) DEVICE — DRAIN WND 15FRX3/16X4.7MM TRCR

## (undated) DEVICE — SYS SEE SHARP SCOPE ANTIFOG